# Patient Record
Sex: FEMALE | Race: WHITE | NOT HISPANIC OR LATINO | Employment: FULL TIME | URBAN - NONMETROPOLITAN AREA
[De-identification: names, ages, dates, MRNs, and addresses within clinical notes are randomized per-mention and may not be internally consistent; named-entity substitution may affect disease eponyms.]

---

## 2017-03-16 ENCOUNTER — OFFICE VISIT (OUTPATIENT)
Dept: OBSTETRICS AND GYNECOLOGY | Facility: CLINIC | Age: 22
End: 2017-03-16

## 2017-03-16 VITALS
BODY MASS INDEX: 29.88 KG/M2 | HEIGHT: 64 IN | WEIGHT: 175 LBS | SYSTOLIC BLOOD PRESSURE: 118 MMHG | DIASTOLIC BLOOD PRESSURE: 56 MMHG

## 2017-03-16 VITALS
BODY MASS INDEX: 33.12 KG/M2 | SYSTOLIC BLOOD PRESSURE: 124 MMHG | DIASTOLIC BLOOD PRESSURE: 62 MMHG | HEIGHT: 64 IN | WEIGHT: 194 LBS

## 2017-03-16 DIAGNOSIS — N94.10 DYSPAREUNIA IN FEMALE: ICD-10-CM

## 2017-03-16 DIAGNOSIS — N83.201 CYST OF RIGHT OVARY: ICD-10-CM

## 2017-03-16 DIAGNOSIS — N93.0 PCB (POST COITAL BLEEDING): ICD-10-CM

## 2017-03-16 DIAGNOSIS — R10.2 PELVIC PAIN: Primary | ICD-10-CM

## 2017-03-16 PROCEDURE — 99214 OFFICE O/P EST MOD 30 MIN: CPT | Performed by: PHYSICIAN ASSISTANT

## 2017-03-16 PROCEDURE — 76830 TRANSVAGINAL US NON-OB: CPT | Performed by: PHYSICIAN ASSISTANT

## 2017-03-16 NOTE — PATIENT INSTRUCTIONS
TVS  normal uterus--IUD in place , right ovary with 3.6 cm hemorrhagic cyst, left ovary normal  rto in 4 weeks for follow up TVS   Cyst will most likely resolve spontaneously  Call if any increased pain or heavy bleeding

## 2017-03-16 NOTE — PROGRESS NOTES
"Subjective   Chief Complaint   Patient presents with   • Vaginal Bleeding     bleeding after intercourse, occurring for 1 month   • Vaginal Pain     Visit Vitals   • /62   • Ht 64\" (162.6 cm)   • Wt 194 lb (88 kg)   • LMP  (LMP Unknown)   • BMI 33.3 kg/m2      Keyana Harrison is a 21 y.o. year old  presenting to be seen for post coital bleeding, dyspareunia and pelvic pain which started one month ago  Patient has Mirena which was placed 2016  She has random bleeds with mirena  For past month bleeding and pain q time with sex  Has not noted any unusual discharge  TVS done today notes normal uterus with IUD in place--right ovary has 3.6 cm complex/hemorrhagic appearing cyst, left ovary normal, small amt free fluid    Past Medical History   Diagnosis Date   • History of Papanicolaou smear of cervix       No current outpatient prescriptions on file.   Allergies   Allergen Reactions   • Amoxicillin       Past Surgical History   Procedure Laterality Date   • Foot surgery     • Other surgical history       pin in toe      Social History     Social History   • Marital status: Single     Spouse name: N/A   • Number of children: N/A   • Years of education: N/A     Occupational History   • Not on file.     Social History Main Topics   • Smoking status: Never Smoker   • Smokeless tobacco: Not on file   • Alcohol use 3.0 oz/week     5 Standard drinks or equivalent per week   • Drug use: No   • Sexual activity: Defer     Other Topics Concern   • Not on file     Social History Narrative      History reviewed. No pertinent family history.    The following portions of the patient's history were reviewed and updated as appropriate:problem list, current medications, allergies, past family history, past medical history, past social history and past surgical history.    Review of Systems   Constitutional: Negative.    Gastrointestinal: Negative.    Genitourinary: Positive for dyspareunia, pelvic pain and vaginal " bleeding. Negative for vaginal discharge.        Objective     Physical Exam   Constitutional: She appears well-developed and well-nourished.   Abdominal: Soft. Normal appearance. She exhibits no distension and no mass. There is no tenderness.   Genitourinary: Vagina normal and uterus normal. There is no tenderness or lesion on the right labia. There is no tenderness or lesion on the left labia. Cervix exhibits discharge. Cervix exhibits no motion tenderness and no friability. Right adnexum displays no mass and no tenderness. Left adnexum displays no mass and no tenderness.   Genitourinary Comments: IUD strings short but noted  Small amt yellow white creamy discharge   Skin: Skin is warm, dry and intact.   Psychiatric: She has a normal mood and affect. Her behavior is normal.     Keyana was seen today for vaginal bleeding and vaginal pain.    Diagnoses and all orders for this visit:    Pelvic pain  -     US Non-ob Transvaginal    PCB (post coital bleeding)  -     US Non-ob Transvaginal  -     Bacterial Vaginosis, LANETTE  -     Chlamydia trachomatis, Neisseria gonorrhoeae, PCR    Dyspareunia in female    Cyst of right ovary  -     US Non-ob Transvaginal; Future             This note was electronically signed.    Vani Alexandre PA-C   March 16, 2017

## 2017-10-09 ENCOUNTER — APPOINTMENT (OUTPATIENT)
Dept: GENERAL RADIOLOGY | Facility: HOSPITAL | Age: 22
End: 2017-10-09

## 2017-10-09 ENCOUNTER — HOSPITAL ENCOUNTER (EMERGENCY)
Facility: HOSPITAL | Age: 22
Discharge: HOME | End: 2017-10-09
Attending: EMERGENCY MEDICINE | Admitting: EMERGENCY MEDICINE

## 2017-10-09 VITALS
RESPIRATION RATE: 17 BRPM | SYSTOLIC BLOOD PRESSURE: 122 MMHG | HEART RATE: 88 BPM | WEIGHT: 180 LBS | HEIGHT: 65 IN | OXYGEN SATURATION: 98 % | TEMPERATURE: 98.1 F | BODY MASS INDEX: 29.99 KG/M2 | DIASTOLIC BLOOD PRESSURE: 78 MMHG

## 2017-10-09 DIAGNOSIS — S93.402A SPRAIN OF LEFT ANKLE, UNSPECIFIED LIGAMENT, INITIAL ENCOUNTER: Primary | ICD-10-CM

## 2017-10-09 PROCEDURE — 99283 EMERGENCY DEPT VISIT LOW MDM: CPT

## 2017-10-09 PROCEDURE — 73610 X-RAY EXAM OF ANKLE: CPT

## 2017-10-09 RX ORDER — NAPROXEN 500 MG/1
500 TABLET ORAL 2 TIMES DAILY PRN
Qty: 14 TABLET | Refills: 0 | Status: SHIPPED | OUTPATIENT
Start: 2017-10-09 | End: 2020-10-06

## 2017-10-09 NOTE — ED PROVIDER NOTES
Subjective   HPI Comments: 22-year-old female presents with left ankle pain, she states that she twisted her ankle walking yesterday and is able to bear weight on the ankle.  She's had previous recent ankle the last several months.  She states the pain is on the inside and outside ankle she has swelling and unable to put full weight on the ankle.    Patient is a 22 y.o. female presenting with lower extremity pain.   History provided by:  Patient   used: No    Lower Extremity Issue   Location:  Ankle  Time since incident:  1 day  Injury: yes    Ankle location:  L ankle  Pain details:     Quality:  Aching, pressure and sharp    Radiates to:  Does not radiate    Severity:  Mild    Onset quality:  Sudden    Duration:  1 day    Timing:  Constant    Progression:  Waxing and waning  Chronicity:  Recurrent  Dislocation: no    Foreign body present:  No foreign bodies  Prior injury to area:  Yes  Relieved by:  Elevation and rest  Worsened by:  Bearing weight, extension, abduction and flexion  Ineffective treatments:  Elevation  Associated symptoms: decreased ROM and swelling        Review of Systems   Musculoskeletal:        Left ankle injury   All other systems reviewed and are negative.      Past Medical History:   Diagnosis Date   • History of Papanicolaou smear of cervix 2015       Allergies   Allergen Reactions   • Amoxicillin        Past Surgical History:   Procedure Laterality Date   • FOOT SURGERY     • OTHER SURGICAL HISTORY      pin in toe       History reviewed. No pertinent family history.    Social History     Social History   • Marital status: Single     Spouse name: N/A   • Number of children: N/A   • Years of education: N/A     Social History Main Topics   • Smoking status: Never Smoker   • Smokeless tobacco: None   • Alcohol use 3.0 oz/week     5 Standard drinks or equivalent per week   • Drug use: No   • Sexual activity: Defer     Other Topics Concern   • None     Social History Narrative            Objective   Physical Exam   Constitutional: She is oriented to person, place, and time. She appears well-developed and well-nourished.   HENT:   Head: Normocephalic.   Eyes: EOM are normal.   Neck: Normal range of motion. Neck supple.   Cardiovascular: Normal rate and regular rhythm.    Pulmonary/Chest: Effort normal and breath sounds normal.   Abdominal: Soft. Bowel sounds are normal.   Musculoskeletal: Normal range of motion. She exhibits tenderness.   Tender to palpation left ankle, neurovascularly intact   Neurological: She is alert and oriented to person, place, and time. She has normal reflexes.   Skin: Skin is warm and dry.   Psychiatric: She has a normal mood and affect.   Nursing note and vitals reviewed.      Procedures         ED Course  ED Course                  MDM    Final diagnoses:   Sprain of left ankle, unspecified ligament, initial encounter            Daniel Galloway Jr., PA-C  10/09/17 0403

## 2018-01-19 ENCOUNTER — TELEPHONE (OUTPATIENT)
Dept: OBSTETRICS AND GYNECOLOGY | Facility: CLINIC | Age: 23
End: 2018-01-19

## 2018-01-19 NOTE — TELEPHONE ENCOUNTER
PT NO SHOWED FOR TVS AND NOW SHE IS IN FOLLOW UP WORKQUEUE.  COULD SOMEONE CALL HER TO SEE IF SHE STILL NEEDS TVS?  IF NOT, WE NEED TO LET PROVIDER KNOW. IF SHE DOES, SHE WILL NEED TO BE SCHEDULED FOR AN APPT.              THANKS,       HEMA

## 2018-01-19 NOTE — TELEPHONE ENCOUNTER
CALLED AND SPOKE WITH PATIENT, SHE ADVISED ALREADY HAD FOLLOW UP AND WASN'T SUPPOSE TO COME BACK, NOT HAVING ANY FURTHER PROBLEMS.

## 2019-06-19 ENCOUNTER — OFFICE VISIT (OUTPATIENT)
Dept: OBSTETRICS AND GYNECOLOGY | Facility: CLINIC | Age: 24
End: 2019-06-19

## 2019-06-19 VITALS
HEIGHT: 65 IN | DIASTOLIC BLOOD PRESSURE: 68 MMHG | WEIGHT: 169.4 LBS | SYSTOLIC BLOOD PRESSURE: 108 MMHG | BODY MASS INDEX: 28.22 KG/M2

## 2019-06-19 DIAGNOSIS — Z12.4 SCREENING FOR MALIGNANT NEOPLASM OF CERVIX: ICD-10-CM

## 2019-06-19 DIAGNOSIS — Z01.419 ENCOUNTER FOR GYNECOLOGICAL EXAMINATION WITHOUT ABNORMAL FINDING: Primary | ICD-10-CM

## 2019-06-19 PROCEDURE — 99395 PREV VISIT EST AGE 18-39: CPT | Performed by: PHYSICIAN ASSISTANT

## 2019-06-19 NOTE — PROGRESS NOTES
"Subjective   Chief Complaint   Patient presents with   • Gynecologic Exam     Last pap done in , No complaints       Keyana Harrison is a 23 y.o. year old  presenting to be seen for her annual gynecological exam.   She has no complaints  Has Mirena IUD placed 2016  No bleeding with IUD typically but has had some random spotting the past 2 months  States had STI screening a few months ago and doesn't feel need to repeat now     Past Medical History:   Diagnosis Date   • History of Papanicolaou smear of cervix         Current Outpatient Medications:   •  naproxen (NAPROSYN) 500 MG tablet, Take 1 tablet by mouth 2 (Two) Times a Day As Needed for Mild Pain  for up to 14 doses., Disp: 14 tablet, Rfl: 0   Allergies   Allergen Reactions   • Amoxicillin Anaphylaxis      Past Surgical History:   Procedure Laterality Date   • FOOT SURGERY     • OTHER SURGICAL HISTORY      pin in toe      Social History     Socioeconomic History   • Marital status: Single     Spouse name: Not on file   • Number of children: Not on file   • Years of education: Not on file   • Highest education level: Not on file   Tobacco Use   • Smoking status: Never Smoker   • Smokeless tobacco: Never Used   Substance and Sexual Activity   • Alcohol use: Yes     Alcohol/week: 3.0 oz     Types: 5 Standard drinks or equivalent per week   • Drug use: No   • Sexual activity: Not Currently     Birth control/protection: IUD      Family History   Problem Relation Age of Onset   • No Known Problems Father    • Polycystic ovary syndrome Mother    • Polycystic ovary syndrome Sister        Review of Systems   Constitutional: Negative.    Gastrointestinal: Negative.    Genitourinary: Positive for vaginal bleeding. Negative for difficulty urinating, dysuria, pelvic pain and vaginal discharge.           Objective   /68   Ht 165.1 cm (65\")   Wt 76.8 kg (169 lb 6.4 oz)   Breastfeeding? No   BMI 28.19 kg/m²     Physical Exam   Constitutional: " She appears well-developed and well-nourished. She is cooperative.   Pulmonary/Chest: Right breast exhibits no inverted nipple, no mass, no nipple discharge, no skin change and no tenderness. Left breast exhibits no inverted nipple, no mass, no nipple discharge, no skin change and no tenderness.   Abdominal: Soft. Normal appearance. There is no tenderness.   Genitourinary: Vagina normal and uterus normal. There is no tenderness or lesion on the right labia. There is no tenderness or lesion on the left labia. Cervix exhibits no motion tenderness and no discharge. Right adnexum displays no mass and no tenderness. Left adnexum displays no mass and no tenderness.   Genitourinary Comments: IUD strings 3 cm  Pap done   Neurological: She is alert.   Skin: Skin is warm and dry.   Psychiatric: She has a normal mood and affect. Her behavior is normal.            Assessment and Plan  Keyana was seen today for gynecologic exam.    Diagnoses and all orders for this visit:    Encounter for gynecological examination without abnormal finding    Screening for malignant neoplasm of cervix  -     Liquid-based Pap Smear, Screening; Future      Patient Instructions   Encourage self breast exam monthly  Regular excercise               This note was electronically signed.    Vani Alexandre PA-C   June 19, 2019

## 2019-06-27 DIAGNOSIS — Z12.4 SCREENING FOR MALIGNANT NEOPLASM OF CERVIX: ICD-10-CM

## 2020-08-27 ENCOUNTER — TRANSCRIBE ORDERS (OUTPATIENT)
Dept: GENERAL RADIOLOGY | Facility: HOSPITAL | Age: 25
End: 2020-08-27

## 2020-08-27 ENCOUNTER — HOSPITAL ENCOUNTER (OUTPATIENT)
Dept: GENERAL RADIOLOGY | Facility: HOSPITAL | Age: 25
Discharge: HOME | End: 2020-08-27
Admitting: NURSE PRACTITIONER

## 2020-08-27 DIAGNOSIS — M25.572 ARTHRALGIA OF LEFT ANKLE OR FOOT: ICD-10-CM

## 2020-08-27 DIAGNOSIS — M25.572 ARTHRALGIA OF LEFT ANKLE OR FOOT: Primary | ICD-10-CM

## 2020-08-27 PROCEDURE — 73610 X-RAY EXAM OF ANKLE: CPT

## 2020-09-02 ENCOUNTER — TRANSCRIBE ORDERS (OUTPATIENT)
Dept: ADMINISTRATIVE | Facility: HOSPITAL | Age: 25
End: 2020-09-02

## 2020-09-02 DIAGNOSIS — M25.572 ARTHRALGIA OF LEFT ANKLE OR FOOT: Primary | ICD-10-CM

## 2020-10-06 ENCOUNTER — TELEPHONE (OUTPATIENT)
Dept: OBSTETRICS AND GYNECOLOGY | Facility: CLINIC | Age: 25
End: 2020-10-06

## 2020-10-06 ENCOUNTER — OFFICE VISIT (OUTPATIENT)
Dept: OBSTETRICS AND GYNECOLOGY | Facility: CLINIC | Age: 25
End: 2020-10-06

## 2020-10-06 VITALS
DIASTOLIC BLOOD PRESSURE: 62 MMHG | SYSTOLIC BLOOD PRESSURE: 110 MMHG | HEIGHT: 65 IN | BODY MASS INDEX: 27.39 KG/M2 | WEIGHT: 164.4 LBS

## 2020-10-06 DIAGNOSIS — Z01.411 ENCOUNTER FOR GYNECOLOGICAL EXAMINATION WITH ABNORMAL FINDING: Primary | ICD-10-CM

## 2020-10-06 DIAGNOSIS — L68.0 FEMALE HIRSUTISM: ICD-10-CM

## 2020-10-06 DIAGNOSIS — Z12.4 SCREENING FOR CERVICAL CANCER: ICD-10-CM

## 2020-10-06 DIAGNOSIS — Z30.431 ENCOUNTER FOR MANAGEMENT OF INTRAUTERINE CONTRACEPTIVE DEVICE (IUD), UNSPECIFIED IUD MANAGEMENT TYPE: ICD-10-CM

## 2020-10-06 DIAGNOSIS — N93.0 POSTCOITAL BLEEDING: ICD-10-CM

## 2020-10-06 PROCEDURE — 99213 OFFICE O/P EST LOW 20 MIN: CPT | Performed by: PHYSICIAN ASSISTANT

## 2020-10-06 PROCEDURE — 99395 PREV VISIT EST AGE 18-39: CPT | Performed by: PHYSICIAN ASSISTANT

## 2020-10-06 NOTE — PATIENT INSTRUCTIONS
Encourage self breast exam monthly  Regular exercise  VG plus swab done and will contact patient with results when available  RTO 3 weeks for Mirena removal and reinsertion pending normal pap and negative vaginal swab

## 2020-10-06 NOTE — TELEPHONE ENCOUNTER
----- Message from Billie Doran sent at 10/6/2020 10:29 AM EDT -----  Regarding: MIRENA  Contact: PT  PT SAW DEONNA TODAY AND NEEDS A NEW MIRENA.  PLEASE E-PRESCRIBE TO [facility].  THANKS

## 2020-10-06 NOTE — PROGRESS NOTES
Subjective   Chief Complaint   Patient presents with   • Gynecologic Exam     Last pap done 19-WNL, C/O bleeding after intercourse   • Contraception     Patient would like to discuss IUD removal and reinsertion       Keyana Harrison is a 25 y.o. year old  presenting to be seen for her annual gynecological exam.   She has a Mirena IUD which is due for removal.  She would like reinsertion of a new IUD.  She is having no menstrual periods with her IUD.  She has had some postcoital bleeding that she reports has been going on for a few months.  She states the bleeding will last for 1 to 2 days after intercourse.  It is occurring most times that she has intercourse.  She denies any vaginal discharge itching or burning.  She has no pain with intercourse.  She has a complaint also of facial hair.  This has become very notable in the last year.  This is on her chin mainly and lower jaws.  It is gotten to the point that she shaves the area due to dark coarse facial hair.    Past Medical History:   Diagnosis Date   • History of Papanicolaou smear of cervix       No current outpatient medications on file.   Allergies   Allergen Reactions   • Amoxicillin Anaphylaxis      Past Surgical History:   Procedure Laterality Date   • FOOT SURGERY     • OTHER SURGICAL HISTORY      pin in toe      Social History     Socioeconomic History   • Marital status: Single     Spouse name: Not on file   • Number of children: Not on file   • Years of education: Not on file   • Highest education level: Not on file   Tobacco Use   • Smoking status: Never Smoker   • Smokeless tobacco: Never Used   Substance and Sexual Activity   • Alcohol use: Yes     Alcohol/week: 5.0 standard drinks     Types: 5 Standard drinks or equivalent per week   • Drug use: No   • Sexual activity: Yes     Birth control/protection: I.U.D.      Family History   Problem Relation Age of Onset   • No Known Problems Father    • Polycystic ovary syndrome Mother    •  "Polycystic ovary syndrome Sister        Review of Systems   Constitutional: Negative for chills, diaphoresis and fever.   Gastrointestinal: Negative for abdominal pain, constipation, diarrhea, nausea and vomiting.   Genitourinary: Positive for vaginal bleeding. Negative for difficulty urinating, dysuria, menstrual problem, pelvic pain, vaginal discharge and vaginal pain.   Musculoskeletal: Negative.    All other systems reviewed and are negative.          Objective   /62   Ht 165.1 cm (65\")   Wt 74.6 kg (164 lb 6.4 oz)   Breastfeeding No   BMI 27.36 kg/m²     Physical Exam  Constitutional:       Appearance: Normal appearance. She is well-developed, well-groomed and normal weight.   Eyes:      General: Lids are normal.      Extraocular Movements: Extraocular movements intact.      Conjunctiva/sclera: Conjunctivae normal.   Chest:      Breasts: Breasts are symmetrical.         Right: No inverted nipple, mass, nipple discharge, skin change or tenderness.         Left: No inverted nipple, mass, nipple discharge, skin change or tenderness.   Abdominal:      General: Abdomen is flat.      Palpations: Abdomen is soft. There is no mass.      Tenderness: There is no abdominal tenderness. There is no guarding.   Genitourinary:     Labia:         Right: No rash, tenderness or lesion.         Left: No rash, tenderness or lesion.       Urethra: No prolapse, urethral pain or urethral lesion.      Vagina: Vaginal discharge present. No erythema, tenderness, bleeding or lesions.      Cervix: Discharge present. No cervical motion tenderness, friability, lesion, erythema or cervical bleeding.      Uterus: Normal.       Adnexa:         Right: No mass or tenderness.          Left: No mass or tenderness.        Comments: Pap done  IUD strings 2 cm  Moderate amount tan creamy discharge  Musculoskeletal: Normal range of motion.   Skin:     General: Skin is warm and dry.      Findings: No lesion or rash.      Comments: Significant " dark coarse hair stubble chin   Neurological:      General: No focal deficit present.      Mental Status: She is alert.   Psychiatric:         Attention and Perception: Attention normal.         Mood and Affect: Mood normal.         Speech: Speech normal.         Behavior: Behavior is cooperative.         Thought Content: Thought content normal. Thought content is not paranoid.              Assessment and Plan  Keyana was seen today for gynecologic exam and contraception.    Diagnoses and all orders for this visit:    Encounter for gynecological examination with abnormal finding    Screening for cervical cancer  -     Liquid-based Pap Smear, Screening; Future    Female hirsutism  -     Testosterone (Free & Total), LC / MS  -     17-Hydroxyprogesterone  -     DHEA-Sulfate    Postcoital bleeding  -     NuSwab VG+ - Swab, Cervix    Encounter for management of intrauterine contraceptive device (IUD), unspecified IUD management type      Patient Instructions   Encourage self breast exam monthly  Regular exercise  VG plus swab done and will contact patient with results when available  RTO 3 weeks for Mirena removal and reinsertion pending normal pap and negative vaginal swab             This note was electronically signed.    Vani Alexandre PA-C   October 6, 2020

## 2020-10-09 LAB
A VAGINAE DNA VAG QL NAA+PROBE: ABNORMAL SCORE
BVAB2 DNA VAG QL NAA+PROBE: ABNORMAL SCORE
C ALBICANS DNA VAG QL NAA+PROBE: NEGATIVE
C GLABRATA DNA VAG QL NAA+PROBE: NEGATIVE
C TRACH DNA VAG QL NAA+PROBE: NEGATIVE
MEGA1 DNA VAG QL NAA+PROBE: ABNORMAL SCORE
N GONORRHOEA DNA VAG QL NAA+PROBE: NEGATIVE
T VAGINALIS DNA VAG QL NAA+PROBE: NEGATIVE

## 2020-10-09 RX ORDER — METRONIDAZOLE 500 MG/1
500 TABLET ORAL 2 TIMES DAILY
Qty: 14 TABLET | Refills: 0 | Status: SHIPPED | OUTPATIENT
Start: 2020-10-09 | End: 2020-10-16

## 2020-10-10 LAB
17OHP SERPL-MCNC: 31 NG/DL
DHEA-S SERPL-MCNC: 422 UG/DL (ref 84.8–378)
TESTOST FREE SERPL-MCNC: 4.8 PG/ML (ref 0–4.2)
TESTOST SERPL-MCNC: 28.3 NG/DL (ref 10–55)

## 2020-10-12 ENCOUNTER — OFFICE VISIT (OUTPATIENT)
Dept: ORTHOPEDIC SURGERY | Facility: CLINIC | Age: 25
End: 2020-10-12

## 2020-10-12 VITALS — HEIGHT: 65 IN | WEIGHT: 164 LBS | BODY MASS INDEX: 27.32 KG/M2 | RESPIRATION RATE: 18 BRPM

## 2020-10-12 DIAGNOSIS — S93.492A SPRAIN OF ANTERIOR TALOFIBULAR LIGAMENT OF LEFT ANKLE, INITIAL ENCOUNTER: ICD-10-CM

## 2020-10-12 DIAGNOSIS — M25.872 ANKLE IMPINGEMENT SYNDROME, LEFT: Primary | ICD-10-CM

## 2020-10-12 DIAGNOSIS — Q68.8 OS TRIGONUM SYNDROME: ICD-10-CM

## 2020-10-12 PROCEDURE — 99213 OFFICE O/P EST LOW 20 MIN: CPT | Performed by: PHYSICIAN ASSISTANT

## 2020-10-12 NOTE — PROGRESS NOTES
Subjective   Patient ID: Keyana Harrison is a 25 y.o. right hand dominant female  Injury of the Left Ankle (Reports she fell while rock climbing the first week of April 2020, treated as sprain, got some better but now getting worse)         History of Present Illness    Patient presents as a new patient with complaints of left ankle pain that began the first week of April 2020.  She states while rockclimbing she fell twisting her ankle.  She did try ice, rest, elevating the ankle, oral ibuprofen as well as a pneumatic boot without significant improvement.  In fact, she states the boot caused pain to other portions of her body.  Patient denies numbness or tingling.  She states plantar flexing the ankle intensifies her pain.    Pain Score: 9  Pain Location: Ankle        Pain Descriptors: Throbbing, Sharp  Pain Frequency: Intermittent  Pain Onset: Ongoing                 Pain Intervention(s): Rest, Home medication, Cold applied, Elevated  Result of Injury: Yes  Work-Related Injury: No    Past Medical History:   Diagnosis Date   • History of Papanicolaou smear of cervix 2015        Past Surgical History:   Procedure Laterality Date   • FOOT SURGERY Right 2011    5th toe reattachment by Dr Diallo   • OTHER SURGICAL HISTORY      pin in toe       Family History   Problem Relation Age of Onset   • No Known Problems Father    • Polycystic ovary syndrome Mother    • Polycystic ovary syndrome Sister        Social History     Socioeconomic History   • Marital status: Single     Spouse name: Not on file   • Number of children: Not on file   • Years of education: Not on file   • Highest education level: Not on file   Occupational History   • Occupation: manager     Employer: [employer]     Comment: since 2016   Tobacco Use   • Smoking status: Never Smoker   • Smokeless tobacco: Never Used   Substance and Sexual Activity   • Alcohol use: Yes     Alcohol/week: 5.0 standard drinks     Types: 5 Standard drinks or  "equivalent per week   • Drug use: No   • Sexual activity: Yes     Birth control/protection: I.U.D.   Social History Narrative    Right hand dominant         Current Outpatient Medications:   •  levonorgestrel (Mirena, 52 MG,) 20 MCG/24HR IUD, 1 each by Intrauterine route 1 (One) Time for 1 dose., Disp: 1 each, Rfl: 0  •  metroNIDAZOLE (Flagyl) 500 MG tablet, Take 1 tablet by mouth 2 (Two) Times a Day for 7 days., Disp: 14 tablet, Rfl: 0    Allergies   Allergen Reactions   • Amoxicillin Anaphylaxis       Review of Systems   Constitutional: Negative for diaphoresis, fever and unexpected weight change.   HENT: Negative for dental problem and sore throat.    Eyes: Negative for visual disturbance.   Respiratory: Negative for shortness of breath.    Cardiovascular: Negative for chest pain.   Gastrointestinal: Negative for abdominal pain, constipation, diarrhea, nausea and vomiting.   Genitourinary: Negative for difficulty urinating and frequency.   Musculoskeletal: Positive for arthralgias (left ankle).   Neurological: Negative for headaches.   Hematological: Does not bruise/bleed easily.        I have reviewed the medical and surgical history, family history, social history, medications, and/or allergies, and the review of systems of this report.    Objective   Resp 18   Ht 165.1 cm (65\")   Wt 74.4 kg (164 lb)   BMI 27.29 kg/m²    Physical Exam  Vitals signs and nursing note reviewed.   Cardiovascular:      Pulses: Normal pulses.   Pulmonary:      Effort: Pulmonary effort is normal. No respiratory distress.   Musculoskeletal:      Left ankle: She exhibits no ecchymosis, no deformity and normal pulse. Tenderness. AITFL, CF ligament and posterior TFL tenderness found. No head of 5th metatarsal and no proximal fibula tenderness found. Achilles tendon exhibits no pain, no defect and normal Wharton's test results.      Left foot: Normal capillary refill. No bony tenderness, swelling, crepitus or deformity.   Neurological: "      General: No focal deficit present.      Mental Status: She is alert and oriented to person, place, and time.   Psychiatric:         Mood and Affect: Mood normal.       Left Ankle Exam     Range of Motion   Plantar flexion: 30     Muscle Strength   Dorsiflexion:  4/5   Plantar flexion:  4/5     Tests   Anterior drawer: trace  Varus tilt: 1+    Other   Erythema: absent  Sensation: normal  Pulse: present           Extremity DVT signs are negative on physical exam with negative Brandan sign, no calf pain, no palpable cords and no skin tone change   Neurologic Exam     Mental Status   Oriented to person, place, and time.              Assessment/Plan   Independent Review of Radiographic Studies:    No new imaging done today.  I did review MRI imaging of the left ankle which does reveal anterior talofibular ligament sprain as well as calcaneofibular ligament partially torn.  There is evidence of os trigonum with marrow edema typically seen with posterior tibiotalar impingement    Procedures       Keyana was seen today for injury.    Diagnoses and all orders for this visit:    Ankle impingement syndrome, left  -     Ambulatory Referral to Physical Therapy Evaluate and treat, Ortho; Heat; Stretching, Strengthening, ROM    Os trigonum syndrome  -     Ambulatory Referral to Physical Therapy Evaluate and treat, Ortho; Heat; Stretching, Strengthening, ROM    Sprain of anterior talofibular ligament of left ankle, initial encounter  -     Ambulatory Referral to Physical Therapy Evaluate and treat, Ortho; Heat; Stretching, Strengthening, ROM       Discussion of orthopedic goals  Risk, benefits, and merits of treatment alternatives reviewed with the patient and questions answered  Physical therapy referral given  Elevate leg and foot for residual swelling  Weight bearing parameters reviewed  Ice, heat, and/or modalities as beneficial    Recommendations/Plan:      Follow-up in 8 weeks after formal physical therapy.  Patient was  provided an ankle lace up brace to be worn when weightbearing  I did recommend taking ibuprofen as directed for the next 7 to 10 days  Patient agreeable to call or return sooner for any concerns.    I explained to the patient if she does not improve with formal physical therapy and using the ankle lace up brace patient may need to be evaluated by foot and ankle subspecialist           EMR Dragon-transcription disclaimer:  This encounter note is an electronic transcription of spoken language to printed text.  Electronic transcription of spoken language may permit erroneous or at times nonsensical words or phrases to be inadvertently transcribed.  Although I have reviewed the note for such errors, some may still exist

## 2020-10-14 DIAGNOSIS — Z12.4 SCREENING FOR CERVICAL CANCER: ICD-10-CM

## 2020-10-20 RX ORDER — DOXYCYCLINE 100 MG/1
100 TABLET ORAL 2 TIMES DAILY
Qty: 20 TABLET | Refills: 0 | Status: SHIPPED | OUTPATIENT
Start: 2020-10-20 | End: 2020-10-30

## 2020-10-20 RX ORDER — DESOGESTREL AND ETHINYL ESTRADIOL 0.15-0.03
1 KIT ORAL DAILY
Qty: 28 TABLET | Refills: 3 | Status: SHIPPED | OUTPATIENT
Start: 2020-10-20 | End: 2021-02-05

## 2020-10-28 ENCOUNTER — TELEPHONE (OUTPATIENT)
Dept: OBSTETRICS AND GYNECOLOGY | Facility: CLINIC | Age: 25
End: 2020-10-28

## 2020-10-28 ENCOUNTER — OFFICE VISIT (OUTPATIENT)
Dept: OBSTETRICS AND GYNECOLOGY | Facility: CLINIC | Age: 25
End: 2020-10-28

## 2020-10-28 VITALS
DIASTOLIC BLOOD PRESSURE: 74 MMHG | BODY MASS INDEX: 27.99 KG/M2 | SYSTOLIC BLOOD PRESSURE: 118 MMHG | HEIGHT: 65 IN | WEIGHT: 168 LBS

## 2020-10-28 DIAGNOSIS — Z30.432 ENCOUNTER FOR IUD REMOVAL: Primary | ICD-10-CM

## 2020-10-28 PROCEDURE — 58301 REMOVE INTRAUTERINE DEVICE: CPT | Performed by: OBSTETRICS & GYNECOLOGY

## 2020-10-28 NOTE — TELEPHONE ENCOUNTER
----- Message from Becca Turner sent at 10/28/2020  8:53 AM EDT -----  Regarding: Refill  Pt needs a refill for her birth control. (Helene's pt)    RX: [facility] [place]

## 2020-10-28 NOTE — PROGRESS NOTES
IUD Removal    Date of procedure:  10/28/2020    Risks and benefits discussed? yes  All questions answered? yes  Consents given by The patient  Written consent obtained? yes  Reason for removal: Device expiration    Local anesthesia used:  no    Procedure documentation:    A speculum was placed in order to view the cervix.  A tenaculum did not need to be placed on the anterior cervical lip.  Cervical dilation did not need to be performed in order to access the string.  The IUD string was easily seen.  The string was grasped and the IUD was removed without difficulty.  The IUD did not appear to be adherent to the uterine cavity. It was removed intact.    She tolerated the procedure, but did have pain. She was given Motrin 600mg one time.     Post procedure instructions: Patient notified to call with heavy bleeding, fever or increasing pain.    Follow up with Helene next week.    Dev Martínez MD  Obstetrics and Gynecology  [facility]

## 2020-11-03 ENCOUNTER — OFFICE VISIT (OUTPATIENT)
Dept: OBSTETRICS AND GYNECOLOGY | Facility: CLINIC | Age: 25
End: 2020-11-03

## 2020-11-03 VITALS
HEIGHT: 65 IN | WEIGHT: 167.4 LBS | SYSTOLIC BLOOD PRESSURE: 112 MMHG | DIASTOLIC BLOOD PRESSURE: 60 MMHG | BODY MASS INDEX: 27.89 KG/M2

## 2020-11-03 DIAGNOSIS — E28.1 ELEVATED ANDROGEN LEVELS: Primary | ICD-10-CM

## 2020-11-03 DIAGNOSIS — L68.0 FEMALE HIRSUTISM: ICD-10-CM

## 2020-11-03 PROCEDURE — 99213 OFFICE O/P EST LOW 20 MIN: CPT | Performed by: PHYSICIAN ASSISTANT

## 2020-11-03 NOTE — PROGRESS NOTES
Subjective   Chief Complaint   Patient presents with   • Follow-up     1 week follow up. Patient had IUD removed and is doing good.       Keyana Harrison is a 25 y.o. year old  presenting to be seen for follow up.  She had IUD removed last week. Recent pap noted actinomyces and patient reports she desires new pessary in a few months.  She is now on first pack of ocp's and doing well.  Recent labs noted sightly elevated free testosterone and DHEAS. Patient had had complaint of hirsutism.  We discussed possibly starting spironolactone but since we are switching to ocp will wait and see if ocp will bring down levels.  She has no new complaints or concerns          Past Medical History:   Diagnosis Date   • History of Papanicolaou smear of cervix         Current Outpatient Medications:   •  desogestrel-ethinyl estradiol (APRI) 0.15-30 MG-MCG per tablet, Take 1 tablet by mouth Daily., Disp: 28 tablet, Rfl: 3   Allergies   Allergen Reactions   • Amoxicillin Anaphylaxis      Past Surgical History:   Procedure Laterality Date   • FOOT SURGERY Right     5th toe reattachment by Dr Diallo   • OTHER SURGICAL HISTORY      pin in toe      Social History     Socioeconomic History   • Marital status: Single     Spouse name: Not on file   • Number of children: Not on file   • Years of education: Not on file   • Highest education level: Not on file   Occupational History   • Occupation: manager     Employer: [employer]     Comment: since 2016   Social Needs   • Financial resource strain: Not hard at all   • Food insecurity     Worry: Never true     Inability: Never true   • Transportation needs     Medical: No     Non-medical: No   Tobacco Use   • Smoking status: Never Smoker   • Smokeless tobacco: Never Used   Substance and Sexual Activity   • Alcohol use: Yes     Alcohol/week: 5.0 standard drinks     Types: 5 Standard drinks or equivalent per week   • Drug use: No   • Sexual activity: Yes     Birth  "control/protection: I.U.D.   Lifestyle   • Physical activity     Days per week: 5 days     Minutes per session: 90 min   • Stress: Only a little   Social History Narrative    Right hand dominant      Family History   Problem Relation Age of Onset   • No Known Problems Father    • Polycystic ovary syndrome Mother    • Polycystic ovary syndrome Sister        Review of Systems   Constitutional: Negative for chills, fatigue and fever.   Gastrointestinal: Negative for abdominal pain, constipation, diarrhea, nausea and vomiting.   Genitourinary: Negative for difficulty urinating, dysuria, pelvic pain, vaginal bleeding and vaginal discharge.   Musculoskeletal: Negative.            Objective   /60   Ht 165.1 cm (65\")   Wt 75.9 kg (167 lb 6.4 oz)   Breastfeeding No   BMI 27.86 kg/m²     Physical Exam         Assessment and Plan  Diagnoses and all orders for this visit:    1. Elevated androgen levels (Primary)  -     Testosterone (Free & Total), LC / MS; Future  -     DHEA-Sulfate; Future    2. Female hirsutism  -     Testosterone (Free & Total), LC / MS; Future  -     DHEA-Sulfate; Future      Patient Instructions   Will continue ocp  Plan repeat free testosterone and  DHEAS in 3 months. If still elevated will discuss starting spironolactone             This note was electronically signed.    Vani Alexandre PA-C   November 4, 2020  "

## 2020-11-04 NOTE — PATIENT INSTRUCTIONS
Will continue ocp  Plan repeat free testosterone and  DHEAS in 3 months. If still elevated will discuss starting spironolactone

## 2021-02-05 RX ORDER — DESOGESTREL AND ETHINYL ESTRADIOL 0.15-0.03
KIT ORAL
Qty: 84 TABLET | Refills: 1 | Status: SHIPPED | OUTPATIENT
Start: 2021-02-05 | End: 2021-10-14

## 2021-10-14 RX ORDER — DESOGESTREL AND ETHINYL ESTRADIOL 0.15-0.03
KIT ORAL
Qty: 84 TABLET | Refills: 0 | Status: SHIPPED | OUTPATIENT
Start: 2021-10-14 | End: 2022-04-19

## 2022-02-15 ENCOUNTER — OFFICE VISIT (OUTPATIENT)
Dept: OBSTETRICS AND GYNECOLOGY | Facility: CLINIC | Age: 27
End: 2022-02-15

## 2022-02-15 VITALS
HEIGHT: 66 IN | DIASTOLIC BLOOD PRESSURE: 62 MMHG | WEIGHT: 177 LBS | SYSTOLIC BLOOD PRESSURE: 118 MMHG | BODY MASS INDEX: 28.45 KG/M2

## 2022-02-15 DIAGNOSIS — N92.6 IRREGULAR MENSES: ICD-10-CM

## 2022-02-15 DIAGNOSIS — L68.0 FEMALE HIRSUTISM: Primary | ICD-10-CM

## 2022-02-15 DIAGNOSIS — E28.1 ELEVATED ANDROGEN LEVELS: ICD-10-CM

## 2022-02-15 DIAGNOSIS — Z30.8 ENCOUNTER FOR OTHER CONTRACEPTIVE MANAGEMENT: ICD-10-CM

## 2022-02-15 PROCEDURE — 99214 OFFICE O/P EST MOD 30 MIN: CPT | Performed by: OBSTETRICS & GYNECOLOGY

## 2022-02-15 NOTE — PROGRESS NOTES
Chief Complaint  Consult (Patient wants to discuss Mirena IUD. Patient also requested labs to check hormone levels. )     History of Present Illness:  Patient is 26 y.o.  who presents to [facility] for discussion of possible Mirena IUD as well as hormone levels.  Patient had previous Mirena IUD.  Patient is having pelvic pain.  Patient did have actinomyces.  Her IUD was removed.  Patient reports her menstrual cycle has been irregular.  Patient reports they will vary from every 2 to 3 weeks.  Patient was on oral contraceptives.  Patient has been off of them now for 2 months.  Patient does have complaints of facial hair as well as an increase in acne.  Patient would like to have her hormone levels checked.  Patient would like to consider having replacement of her IUD.    History  Past Medical History:   Diagnosis Date   • Depression More than a year   • History of Papanicolaou smear of cervix    • Ovarian cyst 2019     Current Outpatient Medications on File Prior to Visit   Medication Sig Dispense Refill   • Apri 0.15-30 MG-MCG per tablet TAKE 1 TABLET BY MOUTH EVERY DAY 84 tablet 0     No current facility-administered medications on file prior to visit.     Allergies   Allergen Reactions   • Amoxicillin Anaphylaxis     Past Surgical History:   Procedure Laterality Date   • FOOT SURGERY Right     5th toe reattachment by Dr Diallo   • OTHER SURGICAL HISTORY      pin in toe     Family History   Problem Relation Age of Onset   • No Known Problems Father    • Polycystic ovary syndrome Mother    • Polycystic ovary syndrome Sister      Social History     Socioeconomic History   • Marital status: Single   Tobacco Use   • Smoking status: Never Smoker   • Smokeless tobacco: Never Used   Vaping Use   • Vaping Use: Never used   Substance and Sexual Activity   • Alcohol use: Yes     Alcohol/week: 2.0 standard drinks     Types: 2 Glasses of wine per week   • Drug use: No   • Sexual  "activity: Not Currently     Partners: Male     Birth control/protection: Condom     Comment: Birth control pills       Physical Examination:  Vital Signs: /62   Ht 167.6 cm (66\")   Wt 80.3 kg (177 lb)   BMI 28.57 kg/m²     General Appearance: alert, appears stated age, and cooperative  Breasts: Not performed.  Abdomen: no masses, no hepatomegaly, no splenomegaly, soft non-tender, no guarding and no rebound tenderness  Pelvic: Not performed.    Data Review:  The following data was reviewed by: Ana Rosa Wesley MD on 02/15/2022:     Labs:  Liquid-based Pap Smear, Screening (10/06/2020)  Testosterone (Free & Total), LC / MS (10/06/2020 10:23)  17-Hydroxyprogesterone (10/06/2020 10:23)  DHEA-Sulfate (10/06/2020 10:23)  NuSwab VG+ - Swab, Cervix (10/06/2020 10:30)    Imaging:    Medical Records:  None    Assessment and Plan   Problem List Items Addressed This Visit     None      Visit Diagnoses     Female hirsutism    -  Primary  We will obtain hormone levels today as noted.  Plan pending results.    Relevant Orders    Testosterone, Free, Total    Follicle Stimulating Hormone    Estradiol    DHEA-Sulfate    Elevated androgen levels      Patient with elevated DHEA-S and testosterone in the past.  Patient is on oral contraceptives.  Will obtain labs today as noted.    Relevant Orders    Testosterone, Free, Total    DHEA-Sulfate    Irregular menses      Patient with irregular menses as noted.  Will obtain thyroid function test.  I discussed with the patient the various treatment options for management of her symptoms.  Patient is desiring Mirena IUD as noted.    Relevant Medications    levonorgestrel (Mirena, 52 MG,) 20 MCG/24HR IUD    Other Relevant Orders    TSH    T4, Free    T3, Free    Encounter for other contraceptive management      Contraceptive counseling was provided regarding long-acting reversible contraception.  The patient was informed that intrauterine devices and contraceptives implants, long-acting " reversible contraceptives (LARC), are the most effective reversible contraceptive methods.  The patient was informed there are five IUDs currently on the market in the [place]: the copper-containing IUD and four levonorgestrel-releasing IUDs.  The use of LARCs has increased during the past decade and the unintended pregnancy rate has decreased in part secondary to LARC use.  Continuation rates are higher as well in comparison to those who chose short-acting methods of contraception.  The unintended pregnancy rate for first year of use is less than 1 per 100 women; the implant is 0.05% and 0.2% for the levonorgestrel 52 IUD.  The unintended pregnancy rate first year of use for the copper T IUD is 0.8% but it has a higher discontinuation rate secondary to heavy menstrual bleeding and pain.  The patient was informed regarding the non-contraceptive benefits as well.  Benefits of the LNG IUD include reduction in heavy menstrual bleeding, anemia, dysmenorrhea, endometriosis-related pain, endometrial hyperplasia, endometrial cancer, ovarian cancer, and cervical cancer.  The patient was informed the LNG 52 IUD is FDA approved for management of heavy menstrual bleeding. Non-contraceptive benefits for the implant include generally lighter menstrual bleeding, although irregular, and improvement in dysmenorrhea.  The patient was also informed regarding the duration of use of each with the Mirena IUD now being approved by the FDA for seven years of use for contraception.  The risks and complications associated with each device were also discussed.  Patient is to follow-up for placement as discussed.    Relevant Medications    levonorgestrel (Mirena, 52 MG,) 20 MCG/24HR IUD          Follow Up/Instructions:  Follow up as noted.  Patient was given instructions and counseling regarding her condition or for health maintenance advice. Please see specific information pulled into the AVS if appropriate.     Note: Speech recognition  transcription software may have been used to dictate portions of this document.  An attempt at proofreading has been made though minor errors in transcription may still be present.    This note was electronically signed.  Ana Rosa Wesley M.D.

## 2022-02-16 LAB
DHEA-S SERPL-MCNC: 588 UG/DL (ref 84.8–378)
ESTRADIOL SERPL-MCNC: 74.1 PG/ML
FSH SERPL-ACNC: 1.8 MIU/ML
T4 FREE SERPL-MCNC: 1.24 NG/DL (ref 0.93–1.7)
TESTOST FREE SERPL-MCNC: 5.6 PG/ML (ref 0–4.2)
TESTOST SERPL-MCNC: 50 NG/DL (ref 13–71)
TSH SERPL DL<=0.005 MIU/L-ACNC: 0.88 UIU/ML (ref 0.27–4.2)

## 2022-02-17 DIAGNOSIS — E28.1 ELEVATED ANDROGEN LEVELS: Primary | ICD-10-CM

## 2022-02-22 ENCOUNTER — HOSPITAL ENCOUNTER (OUTPATIENT)
Dept: CT IMAGING | Facility: HOSPITAL | Age: 27
Discharge: HOME | End: 2022-02-22
Admitting: OBSTETRICS & GYNECOLOGY

## 2022-02-22 DIAGNOSIS — E28.1 ELEVATED ANDROGEN LEVELS: ICD-10-CM

## 2022-02-22 PROCEDURE — 74176 CT ABD & PELVIS W/O CONTRAST: CPT

## 2022-02-28 DIAGNOSIS — L68.0 FEMALE HIRSUTISM: ICD-10-CM

## 2022-02-28 DIAGNOSIS — E28.1 ELEVATED ANDROGEN LEVELS: Primary | ICD-10-CM

## 2022-03-03 ENCOUNTER — LAB (OUTPATIENT)
Dept: LAB | Facility: HOSPITAL | Age: 27
End: 2022-03-03

## 2022-03-03 PROCEDURE — 84403 ASSAY OF TOTAL TESTOSTERONE: CPT | Performed by: OBSTETRICS & GYNECOLOGY

## 2022-03-03 PROCEDURE — 82627 DEHYDROEPIANDROSTERONE: CPT | Performed by: OBSTETRICS & GYNECOLOGY

## 2022-03-03 PROCEDURE — 84481 FREE ASSAY (FT-3): CPT | Performed by: OBSTETRICS & GYNECOLOGY

## 2022-03-03 PROCEDURE — 82533 TOTAL CORTISOL: CPT | Performed by: OBSTETRICS & GYNECOLOGY

## 2022-03-03 PROCEDURE — 84402 ASSAY OF FREE TESTOSTERONE: CPT | Performed by: OBSTETRICS & GYNECOLOGY

## 2022-03-04 DIAGNOSIS — R79.89 HIGH SERUM DEHYDROEPIANDROSTERONE (DHEA): ICD-10-CM

## 2022-03-04 DIAGNOSIS — L68.0 FEMALE HIRSUTISM: ICD-10-CM

## 2022-03-04 DIAGNOSIS — E28.1 ELEVATED ANDROGEN LEVELS: Primary | ICD-10-CM

## 2022-04-19 ENCOUNTER — OFFICE VISIT (OUTPATIENT)
Dept: ENDOCRINOLOGY | Facility: CLINIC | Age: 27
End: 2022-04-19

## 2022-04-19 VITALS
HEIGHT: 66 IN | WEIGHT: 176 LBS | OXYGEN SATURATION: 100 % | HEART RATE: 60 BPM | BODY MASS INDEX: 28.28 KG/M2 | SYSTOLIC BLOOD PRESSURE: 120 MMHG | DIASTOLIC BLOOD PRESSURE: 60 MMHG

## 2022-04-19 DIAGNOSIS — R68.89 ABNORMAL ENDOCRINE LABORATORY TEST FINDING: ICD-10-CM

## 2022-04-19 DIAGNOSIS — R79.89 LOW SERUM CORTISOL LEVEL: ICD-10-CM

## 2022-04-19 DIAGNOSIS — L68.0 HIRSUTISM: Primary | ICD-10-CM

## 2022-04-19 DIAGNOSIS — N92.6 MENSTRUAL PROBLEM: ICD-10-CM

## 2022-04-19 PROCEDURE — 99204 OFFICE O/P NEW MOD 45 MIN: CPT | Performed by: INTERNAL MEDICINE

## 2022-04-19 NOTE — PROGRESS NOTES
Chief Complaint   Patient presents with   • Hirsutism   • Abnormal Lab        New patient who is being seen in consultation regarding elevated DHEA-s at the request of No ref. provider found     HPI   Keyana Harrison is a 26 y.o. female who presents for evaluation of elevated DHEA-S.    Patient presents for DHEA-S, obtained during laboratory evaluation with GYN due to concern for unwanted hair growth.  She reports this growth has been longstanding and is most prominent on the upper neck and chin.  She reports that she has to remove hair on a daily basis.  She reports that growth accelerated following recent change in contraceptive use.  Patient reports that when this value was initially checked and elevated it was attributed to a recent illness.  She was referred for further evaluation when repeat value was again elevated.  She does report that she underwent a CT scan of the abdomen which was unremarkable.  She denies any history of elevated testosterone.  Patient reports intention to have Mirena IUD replaced given history of irregular periods and good response with this device.  She is not currently utilizing contraception.  Patient does report a history of ovarian cyst and irregular menses.  She has never been diagnosed with PCOS.    Patient does report a history of orthostatic hypotension and reports she has to be diligent in maintaining hydration to avoid dizziness.  She reports this was the reason  cortisol level was drawn previously.  She reports being told this was low but no further follow-up was recommended.  She denies recent weight loss, diarrhea.    Past Medical History:   Diagnosis Date   • Depression More than a year   • History of Papanicolaou smear of cervix 2015   • Ovarian cyst 07/2019     Past Surgical History:   Procedure Laterality Date   • FOOT SURGERY Right 2011    5th toe reattachment by Dr Diallo   • OTHER SURGICAL HISTORY      pin in toe      Family History   Problem Relation Age of Onset    • Polycystic ovary syndrome Mother    • Sjogren's syndrome Mother    • No Known Problems Father    • Endometriosis Sister    • No Known Problems Sister    • Bipolar disorder Brother    • ADD / ADHD Brother    • No Known Problems Brother    • No Known Problems Brother    • Asperger's syndrome Brother       Social History     Socioeconomic History   • Marital status: Single   Tobacco Use   • Smoking status: Never Smoker   • Smokeless tobacco: Never Used   Vaping Use   • Vaping Use: Never used   Substance and Sexual Activity   • Alcohol use: Yes     Alcohol/week: 2.0 standard drinks     Types: 2 Glasses of wine per week   • Drug use: No   • Sexual activity: Not Currently     Partners: Male     Birth control/protection: Condom     Comment: Birth control pills      Allergies   Allergen Reactions   • Amoxicillin Anaphylaxis      Current Outpatient Medications on File Prior to Visit   Medication Sig Dispense Refill   • levonorgestrel (Mirena, 52 MG,) 20 MCG/24HR IUD Take to prescribers office 1 each 0   • [DISCONTINUED] Apri 0.15-30 MG-MCG per tablet TAKE 1 TABLET BY MOUTH EVERY DAY 84 tablet 0     No current facility-administered medications on file prior to visit.        Review of Systems   Constitutional: Positive for appetite change. Negative for unexpected weight gain and unexpected weight loss.   HENT: Positive for sinus pressure. Negative for trouble swallowing and voice change.    Eyes: Negative for pain and visual disturbance.   Respiratory: Negative for cough and shortness of breath.    Cardiovascular: Negative for palpitations and leg swelling.   Gastrointestinal: Negative for constipation and diarrhea.   Endocrine: Positive for polyphagia and polyuria.   Genitourinary: Positive for menstrual problem and pelvic pain.   Musculoskeletal: Positive for back pain and myalgias.   Skin: Negative for dry skin and rash.   Allergic/Immunologic: Positive for environmental allergies.   Neurological: Positive for  "light-headedness and headache.   Psychiatric/Behavioral: Negative for depressed mood. The patient is nervous/anxious.        Vitals:    04/19/22 1058   BP: 120/60   BP Location: Right arm   Patient Position: Sitting   Cuff Size: Adult   Pulse: 60   SpO2: 100%   Weight: 79.8 kg (176 lb)   Height: 167.6 cm (66\")   Body mass index is 28.41 kg/m².     Physical Exam  Vitals reviewed.   Constitutional:       General: She is not in acute distress.     Appearance: Normal appearance.   HENT:      Head: Normocephalic and atraumatic.      Right Ear: Hearing normal.      Left Ear: Hearing normal.      Nose: Nose normal.   Eyes:      General: Lids are normal.      Conjunctiva/sclera: Conjunctivae normal.   Neck:      Thyroid: No thyromegaly or thyroid tenderness.   Cardiovascular:      Rate and Rhythm: Normal rate and regular rhythm.      Heart sounds: No murmur heard.  Pulmonary:      Effort: Pulmonary effort is normal.      Breath sounds: Normal breath sounds and air entry.   Abdominal:      General: Bowel sounds are normal.      Palpations: Abdomen is soft.      Tenderness: There is no abdominal tenderness.   Lymphadenopathy:      Head:      Right side of head: No submandibular adenopathy.      Left side of head: No submandibular adenopathy.   Skin:     General: Skin is dry.      Findings: No rash.   Neurological:      General: No focal deficit present.      Mental Status: She is alert.      Deep Tendon Reflexes: Reflexes are normal and symmetric.   Psychiatric:         Mood and Affect: Mood and affect normal.         Behavior: Behavior is cooperative.        Labs/Imaging  PROCEDURE: CT ABDOMEN PELVIS WO CONTRAST-     HISTORY: elevated dheas and free testosterone; evaluate for adrenal  adenoma; E28.1-Androgen excess     COMPARISON: None .     PROCEDURE: Axial images were obtained from the lung bases through the  pubic symphysis without intravenous contrast. .      FINDINGS:      ABDOMEN: The lung bases are clear. The heart " size is normal. The limited  noncontrast images of the liver are normal. The spleen is normal. No  adrenal masses are seen.  The pancreas has an unremarkable unenhanced  appearance.. The aorta is normal in caliber. There is no significant  free fluid or adenopathy.  There is no nephrolithiasis. There is no  hydronephrosis. Limited noncontrast images of the bowel are  unremarkable.     PELVIS: The appendix is normal. The urinary bladder is unremarkable.  There is no significant fluid or adenopathy.     IMPRESSION:  No evidence of an adrenal or ovarian lesion.     Latest Reference Range & Units 10/06/20 10:23 02/15/22 11:32 03/03/22 08:48   17-Hydroxyprogesterone ng/dL 31 [1]     Cortisol   mcg/dL   5.53   DHEA-Sulfate 84.8 - 378.0 ug/dL 422.0 (H) 588.0 (H) 534.0 (H)   FSH mIU/mL  1.8 [2]    Estradiol pg/mL  74.1 [3]    Testosterone, Total 13 - 71 ng/dL 28.3 50 51   Testosterone, Free 0.0 - 4.2 pg/mL 4.8 (H) 5.6 (H) 4.0   TSH Baseline 0.270 - 4.200 uIU/mL  0.881    Free T4 0.93 - 1.70 ng/dL  1.24 [4]    T3, Free 2.00 - 4.40 pg/mL   3.05   (H): Data is abnormally high  [1]                           Adult Female                              Follicular        15 -  70                              Luteal            35 - 290     [2]                     Adult Female:                         Follicular phase      3.5 -  12.5                         Ovulation phase       4.7 -  21.5                         Luteal phase          1.7 -   7.7                         Postmenopausal       25.8 - 134.8     [3]                     Adult Female:                         Follicular phase   12.5 -   166.0                         Ovulation phase    85.8 -   498.0                         Luteal phase       43.8 -   211.0                         Postmenopausal     <6.0 -    54.7                       Pregnancy                         1st trimester     215.0 - >4300.0   Roche ECLIA methodology     [4] Results may be falsely increased if  patient taking Biotin.    Assessment and Plan    Diagnoses and all orders for this visit:    1. Hirsutism (Primary)  -     Basic Metabolic Panel; Future  2. Abnormal endocrine laboratory test finding  -Patient noted to have elevated DHEA-S during evaluation of hirsutism  -Patient with coarse hair growth of the chin and upper neck.  Testosterone levels normal.  DHEA-S most recently 534, slightly decreased from previous.  Patient did have a CT abdomen which was negative for an adrenal or ovarian lesion.  -Discussed that once adrenal/ovarian tumors are excluded, the most common cause of elevated DHEA-S is PCOS.  Patient has not been evaluated for this in the past, see below  -Patient reports that she has had significant nausea with use of oral contraceptives in the past and does not wish to resume hormonal OCP.  She is planning to have Mirena placed with her next menstrual cycle.  Discussed that we could consider initiation of spironolactone once patient is back on contraception.  We will have to be cautious given history of orthostasis.  Discussed that spironolactone is a known teratogen and patient must utilize reliable contraception when taking this medication.  Plan to check BMP to evaluate serum potassium.  Patient will contact the office if she is interested in starting this medication.    3. Low serum cortisol level (HCC)   -Patient reports this was checked due to low blood pressure  -Plan to repeat 8 AM cortisol, discussed that this is less than 10, would recommend ACTH stimulation  -Patient denies recent steroid exposure  -     Cortisol - AM; Future    4. Menstrual problem   -Reviewed this is not likely to be related to elevated DHEA-S  -Discussed possible diagnosis of PCOS, plan to check 17 hydroxyprogesterone, prolactin along with repeat labs   -Patient planning to have Mirena IUD replaced by GYN  -     17-Hydroxyprogesterone; Future  -Prolactin         Return in about 4 months (around 8/19/2022). The  patient was instructed to contact the clinic with any interval questions or concerns.    Silva Browning MD     Dictated Utilizing Dragon Dictation

## 2022-08-23 ENCOUNTER — OFFICE VISIT (OUTPATIENT)
Dept: ENDOCRINOLOGY | Facility: CLINIC | Age: 27
End: 2022-08-23

## 2022-08-23 VITALS
SYSTOLIC BLOOD PRESSURE: 118 MMHG | BODY MASS INDEX: 28.45 KG/M2 | OXYGEN SATURATION: 99 % | WEIGHT: 177 LBS | DIASTOLIC BLOOD PRESSURE: 60 MMHG | HEART RATE: 70 BPM | HEIGHT: 66 IN

## 2022-08-23 DIAGNOSIS — L68.0 HIRSUTISM: ICD-10-CM

## 2022-08-23 DIAGNOSIS — N92.6 MENSTRUAL CYCLE PROBLEM: ICD-10-CM

## 2022-08-23 DIAGNOSIS — R79.89 LOW SERUM CORTISOL LEVEL: Primary | ICD-10-CM

## 2022-08-23 PROCEDURE — 99212 OFFICE O/P EST SF 10 MIN: CPT | Performed by: INTERNAL MEDICINE

## 2022-08-23 NOTE — PROGRESS NOTES
"Chief Complaint   Patient presents with   • Abnormal Lab        HPI   Keyana Harrison is a 27 y.o. female had concerns including Abnormal Lab.      Patient reports no significant health changes in the interim since her last visit.  She reports she has been unable to complete testing ordered following last visit due to constraints with her work schedule.  She does believe she still has a copy of these orders.  She reports that she has deferred having Mirena replaced until laboratory evaluation is complete.  She reports no changes in menses or hair growth.  She continues to have intermittent dizziness with sudden change in position.    The following portions of the patient's history were reviewed and updated as appropriate: allergies, current medications and past social history.    Review of Systems   Constitutional: Negative for unexpected weight gain and unexpected weight loss.   Gastrointestinal: Negative for constipation and diarrhea.   Genitourinary: Positive for menstrual problem.   Neurological: Positive for dizziness.      /60 (BP Location: Right arm, Patient Position: Sitting, Cuff Size: Adult)   Pulse 70   Ht 167.6 cm (66\")   Wt 80.3 kg (177 lb)   SpO2 99%   BMI 28.57 kg/m²      Physical Exam      Constitutional:  well developed; well nourished  no acute distress  appears stated age   ENT/Thyroid: not examined   Eyes: Conjunctiva: clear   Respiratory:  breathing is unlabored  clear to auscultation bilaterally   Cardiovascular:  regular rate and rhythm   Chest:  Not performed.   Abdomen: soft, non-tender; no masses   : Not performed.   Musculoskeletal: Not performed   Skin: not performed.   Neuro: mental status, speech normal   Psych: mood and affect are within normal limits       Labs/Imaging  No interval lab testing completed    Diagnoses and all orders for this visit:    1. Low serum cortisol level (HCC) (Primary)  Per patient report, checked secondary to hypotension.  Patient does continue to " have intermittent dizziness.  She denies any recent steroid exposure.  Patient to complete 8 AM cortisol.    2. Menstrual cycle problem  -Reviewed possible diagnosis of PCOS, we will plan to complete laboratory evaluation to rule out other hormonal causes of irregular menses.  -     17-Hydroxyprogesterone; Future  -     Prolactin; Future    3. Hirsutism  Patient with elevated DHEA-S.  CT abdomen was negative for ovarian or adrenal lesion.  We are evaluating patient for PCOS, as above.  Patient does not desire use of oral contraceptives given history of nausea with these medications previously.  She is still considering placement of Mirena, check BMP and consider use of spironolactone pending remainder of evaluation and placement of IUD.  Discussed that we would need to be cautious given intermittent hypotension.  -     Basic Metabolic Panel; Future    I spent 13 minutes caring for Keyana on this date of service. This time includes time spent by me in the following activities: reviewing tests, performing a medically appropriate examination and/or evaluation, ordering medications, tests, or procedures and documenting information in the medical record     Return in about 6 months (around 2/23/2023) for Next scheduled follow up. The patient was instructed to contact the clinic with any interval questions or concerns.    Silva Browning MD   Endocrinologist    Dictated Utilizing Dragon Dictation

## 2023-02-21 ENCOUNTER — LAB (OUTPATIENT)
Dept: LAB | Facility: HOSPITAL | Age: 28
End: 2023-02-21
Payer: COMMERCIAL

## 2023-02-21 DIAGNOSIS — R79.89 LOW SERUM CORTISOL LEVEL: ICD-10-CM

## 2023-02-21 DIAGNOSIS — N92.6 MENSTRUAL CYCLE PROBLEM: ICD-10-CM

## 2023-02-21 DIAGNOSIS — N92.6 MENSTRUAL PROBLEM: ICD-10-CM

## 2023-02-21 DIAGNOSIS — L68.0 HIRSUTISM: ICD-10-CM

## 2023-02-21 LAB
ANION GAP SERPL CALCULATED.3IONS-SCNC: 6.2 MMOL/L (ref 5–15)
BUN SERPL-MCNC: 14 MG/DL (ref 6–20)
BUN/CREAT SERPL: 17.1 (ref 7–25)
CALCIUM SPEC-SCNC: 9.4 MG/DL (ref 8.6–10.5)
CHLORIDE SERPL-SCNC: 106 MMOL/L (ref 98–107)
CO2 SERPL-SCNC: 25.8 MMOL/L (ref 22–29)
CORTIS SERPL-MCNC: 16.1 MCG/DL
CREAT SERPL-MCNC: 0.82 MG/DL (ref 0.57–1)
EGFRCR SERPLBLD CKD-EPI 2021: 100.7 ML/MIN/1.73
GLUCOSE SERPL-MCNC: 82 MG/DL (ref 65–99)
POTASSIUM SERPL-SCNC: 4.6 MMOL/L (ref 3.5–5.2)
PROLACTIN SERPL-MCNC: 19.9 NG/ML (ref 4.79–23.3)
SODIUM SERPL-SCNC: 138 MMOL/L (ref 136–145)

## 2023-02-21 PROCEDURE — 82533 TOTAL CORTISOL: CPT

## 2023-02-21 PROCEDURE — 80048 BASIC METABOLIC PNL TOTAL CA: CPT

## 2023-02-21 PROCEDURE — 36415 COLL VENOUS BLD VENIPUNCTURE: CPT

## 2023-02-21 PROCEDURE — 84146 ASSAY OF PROLACTIN: CPT

## 2023-02-21 PROCEDURE — 83498 ASY HYDROXYPROGESTERONE 17-D: CPT

## 2023-02-24 ENCOUNTER — OFFICE VISIT (OUTPATIENT)
Dept: ENDOCRINOLOGY | Facility: CLINIC | Age: 28
End: 2023-02-24
Payer: COMMERCIAL

## 2023-02-24 VITALS
DIASTOLIC BLOOD PRESSURE: 60 MMHG | SYSTOLIC BLOOD PRESSURE: 114 MMHG | BODY MASS INDEX: 29.09 KG/M2 | HEIGHT: 66 IN | OXYGEN SATURATION: 100 % | HEART RATE: 70 BPM | WEIGHT: 181 LBS

## 2023-02-24 DIAGNOSIS — N92.6 IRREGULAR MENSES: ICD-10-CM

## 2023-02-24 DIAGNOSIS — L68.0 HIRSUTISM: ICD-10-CM

## 2023-02-24 DIAGNOSIS — R79.89 LOW SERUM CORTISOL LEVEL: Primary | ICD-10-CM

## 2023-02-24 PROCEDURE — 99212 OFFICE O/P EST SF 10 MIN: CPT | Performed by: INTERNAL MEDICINE

## 2023-02-24 NOTE — PROGRESS NOTES
"Chief Complaint   Patient presents with   • Low serum cortisol level         HPI   Keyana Harrison is a 27 y.o. female had concerns including Low serum cortisol level .      Patient reports no significant health changes in the interim since her last visit.  She reports that she still plans to have Mirena IUD placed and this has not yet taken place that she was told she needs a Pap prior to placement has had difficulty getting this scheduled.  She did complete labs earlier this week and available results were reviewed.  She reports that dizziness is improved from prior.  She does still have some concerns regarding unwanted hair growth, cystic acne and irregular menstrual cycles.    The following portions of the patient's history were reviewed and updated as appropriate: allergies, current medications and past social history.    Review of Systems   Constitutional: Negative for unexpected weight gain and unexpected weight loss.   Genitourinary: Positive for menstrual problem.   Neurological: Positive for dizziness.      /60 (BP Location: Right arm, Patient Position: Sitting, Cuff Size: Adult)   Pulse 70   Ht 167.6 cm (66\")   Wt 82.1 kg (181 lb)   SpO2 100%   BMI 29.21 kg/m²      Physical Exam      Constitutional:  well developed; well nourished  no acute distress   ENT/Thyroid: not examined   Eyes: Conjunctiva: clear   Respiratory:  breathing is unlabored  clear to auscultation bilaterally   Cardiovascular:  regular rate and rhythm   Chest:  Not performed.   Abdomen: Not performed.   : Not performed.   Musculoskeletal: Not performed   Skin: not performed.   Neuro: normal without focal findings and mental status, speech normal   Psych: mood and affect are within normal limits       Labs/Imaging   Latest Reference Range & Units 02/21/23 07:46   Glucose 65 - 99 mg/dL 82   Sodium 136 - 145 mmol/L 138   Potassium 3.5 - 5.2 mmol/L 4.6   CO2 22.0 - 29.0 mmol/L 25.8   Chloride 98 - 107 mmol/L 106   Anion Gap 5.0 - " 15.0 mmol/L 6.2   Creatinine 0.57 - 1.00 mg/dL 0.82   BUN 6 - 20 mg/dL 14   BUN/Creatinine Ratio 7.0 - 25.0  17.1   Calcium 8.6 - 10.5 mg/dL 9.4   eGFR >60.0 mL/min/1.73 100.7   Cortisol mcg/dL 16.10   Prolactin 4.79 - 23.30 ng/mL 19.90       Diagnoses and all orders for this visit:    1. Low serum cortisol level (Primary)  Recent cortisol obtained at 8 AM was 16.1, sufficient to rule out adrenal insufficiency.  Reviewed with patient no further investigation is required.    2. Irregular menses  Reviewed possibility of diagnosis of PCOS.  Prior thyroid function testing normal, recent prolactin normal.  17 hydroxyprogesterone is pending.  Patient planning for Mirena placement with GYN.    3. Hirsutism  Patient with elevated DHEA-S, testosterone previously normal. CT abdomen was negative for ovarian or adrenal lesion.  We are evaluating patient for PCOS, as above.    Discussed with patient that we could consider use of spironolactone once IUD in place.  She will contact the clinic if she is interested in this.  Reviewed potential for hypotension with this drug.  Recent BMP with normal electrolytes and kidney function.     I spent 15 minutes caring for Keyana on this date of service. This time includes time spent by me in the following activities: reviewing tests, performing a medically appropriate examination and/or evaluation, counseling and educating the patient/family/caregiver and documenting information in the medical record    Return in about 6 months (around 8/24/2023) for Next scheduled follow up. The patient was instructed to contact the clinic with any interval questions or concerns.    Silva Browning MD   Endocrinologist    Dictated Utilizing Dragon Dictation

## 2023-02-25 LAB — 17OHP SERPL-MCNC: 38 NG/DL

## 2023-03-07 ENCOUNTER — OFFICE VISIT (OUTPATIENT)
Dept: OBSTETRICS AND GYNECOLOGY | Facility: CLINIC | Age: 28
End: 2023-03-07
Payer: COMMERCIAL

## 2023-03-07 VITALS
WEIGHT: 180.8 LBS | SYSTOLIC BLOOD PRESSURE: 110 MMHG | HEIGHT: 66 IN | DIASTOLIC BLOOD PRESSURE: 66 MMHG | BODY MASS INDEX: 29.06 KG/M2

## 2023-03-07 DIAGNOSIS — E28.2 POLYCYSTIC OVARY SYNDROME: ICD-10-CM

## 2023-03-07 DIAGNOSIS — Z11.3 ROUTINE SCREENING FOR STI (SEXUALLY TRANSMITTED INFECTION): ICD-10-CM

## 2023-03-07 DIAGNOSIS — Z12.4 SCREENING FOR CERVICAL CANCER: ICD-10-CM

## 2023-03-07 DIAGNOSIS — Z01.419 ROUTINE GYNECOLOGICAL EXAMINATION: Primary | ICD-10-CM

## 2023-03-07 DIAGNOSIS — Z30.014 ENCOUNTER FOR INITIAL PRESCRIPTION OF INTRAUTERINE CONTRACEPTIVE DEVICE (IUD): ICD-10-CM

## 2023-03-07 PROCEDURE — 99395 PREV VISIT EST AGE 18-39: CPT | Performed by: PHYSICIAN ASSISTANT

## 2023-03-07 RX ORDER — LEVONORGESTREL 19.5 MG/1
1 INTRAUTERINE DEVICE INTRAUTERINE ONCE
Qty: 1 EACH | Refills: 0 | Status: SHIPPED | OUTPATIENT
Start: 2023-03-07 | End: 2023-03-07

## 2023-03-07 NOTE — PROGRESS NOTES
Subjective   Chief Complaint   Patient presents with   • Gynecologic Exam     Last pap done 10/6/20-WNL, Discuss getting an IUD, No complaints       Keyana Harrison is a 27 y.o. year old  presenting to be seen for her annual gynecological exam. Not currently sexually active but desiring to get IUD. LMP 2023.  Cycles irregular and occur q 2-6 weeks. Hx of elevated DHEAS and testosterone levels c/w PCOS. Has seen endocrinology which ruled out adrenal tumor  LMP 2023  No complaints today      Past Medical History:   Diagnosis Date   • Depression More than a year   • History of Papanicolaou smear of cervix    • Ovarian cyst 2019   • Polycystic ovary syndrome 3/7/2023        Current Outpatient Medications:   •  levonorgestrel (Kyleena) 19.5 MG intrauterine device IUD, Take to provider's office, Disp: 1 each, Rfl: 0   Allergies   Allergen Reactions   • Amoxicillin Anaphylaxis      Past Surgical History:   Procedure Laterality Date   • FOOT SURGERY Right     5th toe reattachment by Dr Diallo   • OTHER SURGICAL HISTORY      pin in toe      Social History     Socioeconomic History   • Marital status: Single   Tobacco Use   • Smoking status: Never   • Smokeless tobacco: Never   Vaping Use   • Vaping Use: Never used   Substance and Sexual Activity   • Alcohol use: Yes     Alcohol/week: 2.0 standard drinks     Types: 2 Glasses of wine per week   • Drug use: No   • Sexual activity: Not Currently     Partners: Male     Birth control/protection: Condom     Comment: Birth control pills      Family History   Problem Relation Age of Onset   • Polycystic ovary syndrome Mother    • Sjogren's syndrome Mother    • Hypertension Father    • Endometriosis Sister    • No Known Problems Sister    • Bipolar disorder Brother    • ADD / ADHD Brother    • No Known Problems Brother    • No Known Problems Brother    • Asperger's syndrome Brother        Review of Systems   Constitutional: Negative for chills, diaphoresis  "and fever.   Gastrointestinal: Negative.    Genitourinary: Negative for difficulty urinating, dysuria, pelvic pain and vaginal discharge.           Objective   /66   Ht 167.6 cm (66\")   Wt 82 kg (180 lb 12.8 oz)   LMP 02/17/2023 (Exact Date)   BMI 29.18 kg/m²     Physical Exam  Exam conducted with a chaperone present.   Constitutional:       Appearance: Normal appearance. She is well-developed and well-groomed.   Eyes:      General: Lids are normal.      Extraocular Movements: Extraocular movements intact.      Conjunctiva/sclera: Conjunctivae normal.   Chest:   Breasts:     Breasts are symmetrical.      Right: No inverted nipple, mass, nipple discharge, skin change or tenderness.      Left: No inverted nipple, mass, nipple discharge, skin change or tenderness.   Abdominal:      Palpations: Abdomen is soft.      Tenderness: There is no abdominal tenderness.   Genitourinary:     Labia:         Right: No rash, tenderness or lesion.         Left: No rash, tenderness or lesion.       Urethra: No prolapse, urethral pain, urethral swelling or urethral lesion.      Vagina: No vaginal discharge, tenderness or lesions.      Cervix: No cervical motion tenderness, discharge, friability or lesion.      Uterus: Not enlarged and not tender.       Adnexa:         Right: No mass or tenderness.          Left: No mass or tenderness.     Lymphadenopathy:      Upper Body:      Right upper body: No axillary adenopathy.      Left upper body: No axillary adenopathy.   Skin:     General: Skin is warm and dry.      Findings: No bruising or lesion.   Neurological:      General: No focal deficit present.      Mental Status: She is alert and oriented to person, place, and time.   Psychiatric:         Attention and Perception: Attention normal.         Mood and Affect: Mood normal.         Speech: Speech normal.         Behavior: Behavior is cooperative.            Result Review :                   Assessment and Plan  Diagnoses and " all orders for this visit:    1. Routine gynecological examination (Primary)    2. Screening for cervical cancer  -     LIQUID-BASED PAP SMEAR WITH HPV GENOTYPING IF ASCUS ([facility])    3. Routine screening for STI (sexually transmitted infection)    4. Polycystic ovary syndrome    5. Encounter for initial prescription of intrauterine contraceptive device (IUD)    Other orders  -     levonorgestrel (Kyleena) 19.5 MG intrauterine device IUD; Take to provider's office  Dispense: 1 each; Refill: 0      Patient Instructions   Self breast exam monthly  Regular exercise    Once Kyleena arrives in office call first day of menses to schedule Kyleena             This note was electronically signed.    Vani Alexandre PA-C   March 7, 2023

## 2023-03-09 LAB — REF LAB TEST METHOD: NORMAL

## 2023-05-15 ENCOUNTER — OFFICE VISIT (OUTPATIENT)
Dept: OBSTETRICS AND GYNECOLOGY | Facility: CLINIC | Age: 28
End: 2023-05-15
Payer: COMMERCIAL

## 2023-05-15 VITALS
BODY MASS INDEX: 28.16 KG/M2 | DIASTOLIC BLOOD PRESSURE: 66 MMHG | HEIGHT: 66 IN | SYSTOLIC BLOOD PRESSURE: 112 MMHG | WEIGHT: 175.2 LBS

## 2023-05-15 DIAGNOSIS — Z30.430 ENCOUNTER FOR IUD INSERTION: Primary | ICD-10-CM

## 2023-05-15 LAB
B-HCG UR QL: NEGATIVE
EXPIRATION DATE: NORMAL
INTERNAL NEGATIVE CONTROL: NEGATIVE
INTERNAL POSITIVE CONTROL: POSITIVE
Lab: NORMAL

## 2023-05-15 NOTE — PROGRESS NOTES
"Subjective   Chief Complaint   Patient presents with   • Contraception     IUD insertion, device supplied from our stock, UPT-Negative       Keyana Harrison is a 27 y.o. year old  presenting to be seen for Kyleena insertion  Recent pap normal and negative STI screening  Has no complaints or concerns      Past Medical History:   Diagnosis Date   • Depression More than a year   • History of Papanicolaou smear of cervix    • Ovarian cyst 2019   • Polycystic ovary syndrome 3/7/2023        Current Outpatient Medications:   •  levonorgestrel (Kyleena) 19.5 MG intrauterine device IUD, Take to provider's office, Disp: 1 each, Rfl: 0    Current Facility-Administered Medications:   •  levonorgestrel (KYLEENA) 19.5 MG IUD 1 each, 1 each, Intrauterine, Once, Vani Alexandre PA-C   Allergies   Allergen Reactions   • Amoxicillin Anaphylaxis      Past Surgical History:   Procedure Laterality Date   • FOOT SURGERY Right     5th toe reattachment by Dr Diallo   • OTHER SURGICAL HISTORY      pin in toe      Social History     Socioeconomic History   • Marital status: Single   Tobacco Use   • Smoking status: Never   • Smokeless tobacco: Never   Vaping Use   • Vaping Use: Never used   Substance and Sexual Activity   • Alcohol use: Yes     Alcohol/week: 2.0 standard drinks     Types: 2 Glasses of wine per week   • Drug use: No   • Sexual activity: Not Currently     Partners: Male     Birth control/protection: Condom, I.U.D.     Comment: Birth control pills      Family History   Problem Relation Age of Onset   • Polycystic ovary syndrome Mother    • Sjogren's syndrome Mother    • Hypertension Father    • Endometriosis Sister    • No Known Problems Sister    • Bipolar disorder Brother    • ADD / ADHD Brother    • No Known Problems Brother    • No Known Problems Brother    • Asperger's syndrome Brother        Review of Systems        Objective   /66   Ht 167.6 cm (66\")   Wt 79.5 kg (175 lb 3.2 oz)   LMP " 05/10/2023   BMI 28.28 kg/m²     Physical Exam       Result Review :                   Assessment and Plan  Diagnoses and all orders for this visit:    1. Encounter for IUD insertion (Primary)  -     levonorgestrel (KYLEENA) 19.5 MG IUD 1 each  -     POC Pregnancy, Urine      There are no Patient Instructions on file for this visit.           This note was electronically signed.    Vani Alexandre PA-C   May 15, 2023

## 2023-05-15 NOTE — PROGRESS NOTES
IUD Insertion    Patient's last menstrual period was 05/10/2023.    Date of procedure:  5/15/2023    Risks and benefits discussed? yes  All questions answered? yes  Consents given by The patient  Written consent obtained? yes    Local anesthesia used:  no    Procedure documentation:    After verifying the patient had a low probability of being pregnant and met the criteria for insertion, a sterile speculum has placed and the cervix was cleansed with an antiseptic solution.  Vaginal discharge was scant.  The anterior lip of the cervix was grasped with a tenaculum and the uterine cavity was gently sounded. There was no difficulty passing the sound through the cervix.  Cervical dilation did not need to be performed prior to placing the IUD.  The uterus was anteverted and sounded to 6 cms.  The Kyleena IUD was then prepared per the manufacturers instructions.    The Kyleena was advanced to a point 2 cms from the fundus and then the arms were released from the sheath.  The device was advanced to the fundus and the device was released fully from the sheath.. The string was cut 3 cms in length.  Bleeding from the cervix was scant.    She tolerated the procedure without any difficulty.     Post procedure instructions:  Follow up in 5 weeks for IUD check. Call office if any heavy bleeding, significant pain or cramping, fever or chills. No intercourse x 7 days.   It was reviewed that the Kyleena will not alter the timing of when she bleeds but it may decrease the quantity of flow and cramps.  Roughly 30% of people will be amenorrheic over time.  Efficacy rate of 99.2% over 5 years was discussed.     This note was electronically signed.    Vani Alexandre PA-C  May 15, 2023

## 2023-06-02 ENCOUNTER — OFFICE VISIT (OUTPATIENT)
Dept: INTERNAL MEDICINE | Facility: CLINIC | Age: 28
End: 2023-06-02

## 2023-06-02 VITALS
BODY MASS INDEX: 28.61 KG/M2 | TEMPERATURE: 99.1 F | HEIGHT: 66 IN | HEART RATE: 81 BPM | WEIGHT: 178 LBS | DIASTOLIC BLOOD PRESSURE: 64 MMHG | OXYGEN SATURATION: 97 % | SYSTOLIC BLOOD PRESSURE: 120 MMHG

## 2023-06-02 DIAGNOSIS — Z76.89 ENCOUNTER TO ESTABLISH CARE: Primary | ICD-10-CM

## 2023-06-02 DIAGNOSIS — I83.892 SYMPTOMATIC VARICOSE VEINS, LEFT: ICD-10-CM

## 2023-06-02 NOTE — PROGRESS NOTES
Date: 2023    Name: Keyana Harrison  : 1995    Chief Complaint:   Chief Complaint   Patient presents with    [facility]           Leg Pain     Intermittent leg pain L leg, possible varicose vein       HPI:  Keyana Harrison is a 27 y.o. female presenting to [facility].    She has had intermittent left leg pain since 2023. She had a possible varicose vein that appeared in her left upper thigh and over time, she feels like the vein has moved down her left leg. At one point in time that she had a bruise on her left thigh around the vein, but that has since resolved. She is currently not having any pain, occurs intermittently. She has no personal or family history of DVTs, PEs, or hematological disorders. There has been no injury to that site and there is no redness or erythema. She has been wearing compression socks but unsure if this is helping.  She has no dyspnea but has had random intermittent fleeting chest pains all her life but nothing that has happened/worsened since the left leg pain; it lasts for less than a minute and then goes away. She denies dyspnea on exertion, diaphoresis, visual changes, headaches, or syncope associated with it.  Her last menstrual period was 05/10/2023. She is not currently sexually active. She had a Pap smear on 2023 that was normal. She is a  0, para 0. She sees endocrinology for a history of low serum cortisol levels, irregular menses, and was being worked up for a diagnosis of PCOS, which she sees the gynecologist for. She currently has an IUD, Kyleena, and no other medications.   She doesn't take any vitamins or supplements. She is not up to date on annual dental and eye examinations but will schedule. She exercises 4 to 5 times weekly, CrossFit, and goes to a private gym. She eats a balanced diet and does not eat out or snack frequently. She mostly cooks her own food at home.    History:    Past Medical History:   Diagnosis Date    Depression  "More than a year    History of Papanicolaou smear of cervix 2015    Ovarian cyst 07/2019    Polycystic ovary syndrome 3/7/2023       Past Surgical History:   Procedure Laterality Date    FOOT SURGERY Right 2011    5th toe reattachment by Dr Diallo    OTHER SURGICAL HISTORY      pin in toe       Family History   Problem Relation Age of Onset    Polycystic ovary syndrome Mother     Sjogren's syndrome Mother     Hypertension Father     Endometriosis Sister     No Known Problems Sister     Bipolar disorder Brother     ADD / ADHD Brother     No Known Problems Brother     No Known Problems Brother     Asperger's syndrome Brother        Social History     Socioeconomic History    Marital status: Single   Tobacco Use    Smoking status: Never    Smokeless tobacco: Never   Vaping Use    Vaping Use: Never used   Substance and Sexual Activity    Alcohol use: Yes     Alcohol/week: 2.0 standard drinks     Types: 2 Glasses of wine per week    Drug use: No    Sexual activity: Not Currently     Partners: Male     Birth control/protection: Condom, I.U.D.     Comment: Birth control pills       Allergies   Allergen Reactions    Amoxicillin Anaphylaxis         Current Outpatient Medications:     levonorgestrel (Kyleena) 19.5 MG intrauterine device IUD, Take to provider's office, Disp: 1 each, Rfl: 0        VS:  Vitals:    06/02/23 1613   BP: 120/64   BP Location: Left arm   Patient Position: Sitting   Cuff Size: Adult   Pulse: 81   Temp: 99.1 °F (37.3 °C)   TempSrc: Temporal   SpO2: 97%   Weight: 80.7 kg (178 lb)   Height: 166.4 cm (65.5\")     Body mass index is 29.17 kg/m².    PE:  Physical Exam  Vitals and nursing note reviewed.   Constitutional:       Appearance: Normal appearance. She is not toxic-appearing or diaphoretic.   HENT:      Head: Normocephalic and atraumatic.   Eyes:      Extraocular Movements: Extraocular movements intact.   Neck:      Vascular: No carotid bruit.   Cardiovascular:      Rate and Rhythm: Normal rate and " regular rhythm.      Pulses:           Dorsalis pedis pulses are 2+ on the right side and 2+ on the left side.        Posterior tibial pulses are 2+ on the right side and 2+ on the left side.      Heart sounds: Normal heart sounds, S1 normal and S2 normal. No murmur heard.     Comments: No erythema, warmth or edema noted to LLE, enlarged varicose veins noted LLE, no discoloration   Pulmonary:      Effort: Pulmonary effort is normal.      Breath sounds: Normal breath sounds.   Abdominal:      General: There is no abdominal bruit.   Musculoskeletal:         General: Normal range of motion.      Cervical back: Normal range of motion and neck supple.      Right lower leg: No edema.      Left lower leg: No edema.   Skin:     General: Skin is warm and dry.      Capillary Refill: Capillary refill takes less than 2 seconds.      Coloration: Skin is not jaundiced.      Findings: No erythema.   Neurological:      General: No focal deficit present.      Mental Status: She is alert and oriented to person, place, and time.   Psychiatric:         Mood and Affect: Mood normal.         Behavior: Behavior normal.         Thought Content: Thought content normal.         Judgment: Judgment normal.     Results Review:       Assessment/Plan:         Diagnoses and all orders for this visit:    1. Encounter to establish care (Primary)    2. Symptomatic varicose veins, left  -     Cancel: US venous doppler lower extremity left (duplex)  -     US venous doppler lower extremity left (duplex)    Symptomatic varicose veins off the left lower extremity  Venous Doppler of the left lower extremity ordered. Recommended that she take baby aspirin 81 mg daily and apply warm compresses with a heating pad 15-20-minute intervals at least three times daily while keeping the left leg elevated.  If she is to develop any swelling, redness, or warmth to her left leg, chest pain, SOA she is to be seen emergently. She verbalized understanding and risks.        Return in about 4 weeks (around 6/30/2023).      Adali Piper     Transcribed from ambient dictation for PATRICIA Niño by Janna Bhatia.  06/02/23   20:14 EDT    I have personally performed the services described in this document as transcribed by the above individual, and it is both accurate and complete.      I spent 33 minutes caring for Keyana Harrison on this date of service. This time includes time spent by me in the following activities: preparing for the visit, reviewing tests, performing a medically appropriate examination and/or evaluation, counseling and educating the patient/family/caregiver, ordering medications, tests, or procedures and documenting information in the medical record.

## 2023-06-09 ENCOUNTER — PATIENT ROUNDING (BHMG ONLY) (OUTPATIENT)
Dept: INTERNAL MEDICINE | Facility: CLINIC | Age: 28
End: 2023-06-09
Payer: COMMERCIAL

## 2023-06-09 NOTE — PROGRESS NOTES
A Fliggo message has been sent to patient for PATIENT ROUNDING with [facility] [place].

## 2023-06-12 ENCOUNTER — OFFICE VISIT (OUTPATIENT)
Dept: OBSTETRICS AND GYNECOLOGY | Facility: CLINIC | Age: 28
End: 2023-06-12
Payer: COMMERCIAL

## 2023-06-12 VITALS
SYSTOLIC BLOOD PRESSURE: 110 MMHG | BODY MASS INDEX: 28.45 KG/M2 | HEIGHT: 66 IN | WEIGHT: 177 LBS | DIASTOLIC BLOOD PRESSURE: 72 MMHG

## 2023-06-12 DIAGNOSIS — Z30.431 ENCOUNTER FOR ROUTINE CHECKING OF INTRAUTERINE CONTRACEPTIVE DEVICE (IUD): Primary | ICD-10-CM

## 2023-06-12 PROCEDURE — 99212 OFFICE O/P EST SF 10 MIN: CPT | Performed by: PHYSICIAN ASSISTANT

## 2023-06-12 NOTE — PROGRESS NOTES
"Subjective   Chief Complaint   Patient presents with   • Follow-up     5 week follow-up IUD check, doing well       Keyana Harrison is a 27 y.o. year old  presenting to be seen for IUD check  Has done well post insertion  Light random bleeding post insertion  No complaints or concerns      Past Medical History:   Diagnosis Date   • Depression More than a year   • History of Papanicolaou smear of cervix    • Ovarian cyst 2019   • Polycystic ovary syndrome 3/7/2023        Current Outpatient Medications:   •  levonorgestrel (Kyleena) 19.5 MG intrauterine device IUD, Take to provider's office, Disp: 1 each, Rfl: 0   Allergies   Allergen Reactions   • Amoxicillin Anaphylaxis      Past Surgical History:   Procedure Laterality Date   • FOOT SURGERY Right     5th toe reattachment by Dr Diallo   • OTHER SURGICAL HISTORY      pin in toe      Social History     Socioeconomic History   • Marital status: Single   Tobacco Use   • Smoking status: Never   • Smokeless tobacco: Never   Vaping Use   • Vaping Use: Never used   Substance and Sexual Activity   • Alcohol use: Yes     Alcohol/week: 1.0 standard drink     Types: 1 Glasses of wine per week   • Drug use: No   • Sexual activity: Not Currently     Partners: Male     Birth control/protection: Condom, I.U.D.     Comment: Birth control pills      Family History   Problem Relation Age of Onset   • Polycystic ovary syndrome Mother    • Sjogren's syndrome Mother    • Hypertension Father    • Endometriosis Sister    • No Known Problems Sister    • Bipolar disorder Brother    • ADD / ADHD Brother    • No Known Problems Brother    • No Known Problems Brother    • Asperger's syndrome Brother        Review of Systems   Constitutional: Negative for chills, diaphoresis and fever.   Gastrointestinal: Negative.    Genitourinary: Negative for difficulty urinating and dysuria.           Objective   /72   Ht 167.6 cm (66\")   Wt 80.3 kg (177 lb)   BMI 28.57 kg/m² "     Physical Exam  Constitutional:       Appearance: Normal appearance. She is well-developed and well-groomed.   Eyes:      General: Lids are normal.      Extraocular Movements: Extraocular movements intact.      Conjunctiva/sclera: Conjunctivae normal.   Genitourinary:     Labia:         Right: No rash, tenderness or lesion.         Left: No rash, tenderness or lesion.       Urethra: No prolapse, urethral pain, urethral swelling or urethral lesion.      Comments: Scant dark blood observed  IUD strings 3 cm  Neurological:      Mental Status: She is alert.   Psychiatric:         Attention and Perception: Attention normal.         Mood and Affect: Mood normal.         Speech: Speech normal.         Behavior: Behavior is cooperative.            Result Review :                   Assessment and Plan  Diagnoses and all orders for this visit:    1. Encounter for routine checking of intrauterine contraceptive device (IUD) (Primary)      Patient Instructions   RTO 1 year or prn             This note was electronically signed.    Vani Alexandre PA-C   June 12, 2023

## 2023-06-15 ENCOUNTER — HOSPITAL ENCOUNTER (OUTPATIENT)
Dept: ULTRASOUND IMAGING | Facility: HOSPITAL | Age: 28
Discharge: HOME | End: 2023-06-15
Admitting: NURSE PRACTITIONER
Payer: COMMERCIAL

## 2023-06-15 PROCEDURE — 93971 EXTREMITY STUDY: CPT

## 2023-08-03 ENCOUNTER — TELEPHONE (OUTPATIENT)
Dept: INTERNAL MEDICINE | Facility: CLINIC | Age: 28
End: 2023-08-03

## 2023-08-03 NOTE — TELEPHONE ENCOUNTER
Spoke with patient, advised that due to her insurance she will need to contact the health department for this particular vaccine.

## 2023-08-03 NOTE — TELEPHONE ENCOUNTER
PATIENT WOULD LIKE APPOINTMENT TO GET VARICELLA ONLY.  NEEDS ORDERS.  FOR SCHOOL, CLINICALS.  NEEDS BY TUESDAY.      PLEASE CALL [phone]

## 2023-10-10 ENCOUNTER — TELEPHONE (OUTPATIENT)
Dept: INTERNAL MEDICINE | Facility: CLINIC | Age: 28
End: 2023-10-10

## 2023-10-10 DIAGNOSIS — Z11.1 TUBERCULOSIS SCREENING: Primary | ICD-10-CM

## 2023-10-13 LAB
GAMMA INTERFERON BACKGROUND BLD IA-ACNC: 0.05 IU/ML
M TB IFN-G BLD-IMP: NEGATIVE
M TB IFN-G CD4+ T-CELLS BLD-ACNC: 0.05 IU/ML
M TBIFN-G CD4+ CD8+T-CELLS BLD-ACNC: 0.05 IU/ML
MITOGEN IGNF BLD-ACNC: >10 IU/ML
QUANTIFERON INCUBATION: NORMAL
SERVICE CMNT-IMP: NORMAL

## 2024-07-16 ENCOUNTER — OFFICE VISIT (OUTPATIENT)
Dept: INTERNAL MEDICINE | Facility: CLINIC | Age: 29
End: 2024-07-16
Payer: MEDICAID

## 2024-07-16 VITALS
DIASTOLIC BLOOD PRESSURE: 72 MMHG | TEMPERATURE: 97.7 F | HEIGHT: 66 IN | WEIGHT: 185 LBS | HEART RATE: 65 BPM | OXYGEN SATURATION: 100 % | BODY MASS INDEX: 29.73 KG/M2 | SYSTOLIC BLOOD PRESSURE: 110 MMHG

## 2024-07-16 DIAGNOSIS — Z13.0 SCREENING FOR DEFICIENCY ANEMIA: ICD-10-CM

## 2024-07-16 DIAGNOSIS — Z00.00 ANNUAL PHYSICAL EXAM: Primary | ICD-10-CM

## 2024-07-16 DIAGNOSIS — Z11.1 TUBERCULOSIS SCREENING: ICD-10-CM

## 2024-07-16 DIAGNOSIS — Z13.220 SCREENING FOR CHOLESTEROL LEVEL: ICD-10-CM

## 2024-07-16 DIAGNOSIS — Z13.29 SCREENING FOR ENDOCRINE, METABOLIC AND IMMUNITY DISORDER: ICD-10-CM

## 2024-07-16 DIAGNOSIS — I83.892 SYMPTOMATIC VARICOSE VEINS OF LEFT LOWER EXTREMITY: ICD-10-CM

## 2024-07-16 DIAGNOSIS — Z13.228 SCREENING FOR ENDOCRINE, METABOLIC AND IMMUNITY DISORDER: ICD-10-CM

## 2024-07-16 DIAGNOSIS — L70.0 ACNE VULGARIS: ICD-10-CM

## 2024-07-16 DIAGNOSIS — Z13.0 SCREENING FOR ENDOCRINE, METABOLIC AND IMMUNITY DISORDER: ICD-10-CM

## 2024-07-16 LAB
ALBUMIN SERPL-MCNC: 4.4 G/DL (ref 3.5–5.2)
ALBUMIN/GLOB SERPL: 2 G/DL
ALP SERPL-CCNC: 46 U/L (ref 39–117)
ALT SERPL-CCNC: 16 U/L (ref 1–33)
AST SERPL-CCNC: 11 U/L (ref 1–32)
BILIRUB SERPL-MCNC: 0.8 MG/DL (ref 0–1.2)
BUN SERPL-MCNC: 14 MG/DL (ref 6–20)
BUN/CREAT SERPL: 18.2 (ref 7–25)
CALCIUM SERPL-MCNC: 9.2 MG/DL (ref 8.6–10.5)
CHLORIDE SERPL-SCNC: 105 MMOL/L (ref 98–107)
CHOLEST SERPL-MCNC: 141 MG/DL (ref 0–200)
CO2 SERPL-SCNC: 26.9 MMOL/L (ref 22–29)
CREAT SERPL-MCNC: 0.77 MG/DL (ref 0.57–1)
EGFRCR SERPLBLD CKD-EPI 2021: 107.9 ML/MIN/1.73
ERYTHROCYTE [DISTWIDTH] IN BLOOD BY AUTOMATED COUNT: 11.5 % (ref 12.3–15.4)
GLOBULIN SER CALC-MCNC: 2.2 GM/DL
GLUCOSE SERPL-MCNC: 84 MG/DL (ref 65–99)
HCT VFR BLD AUTO: 41.8 % (ref 34–46.6)
HDLC SERPL-MCNC: 69 MG/DL (ref 40–60)
HGB BLD-MCNC: 13.7 G/DL (ref 12–15.9)
LDLC SERPL CALC-MCNC: 62 MG/DL (ref 0–100)
MCH RBC QN AUTO: 32.3 PG (ref 26.6–33)
MCHC RBC AUTO-ENTMCNC: 32.8 G/DL (ref 31.5–35.7)
MCV RBC AUTO: 98.6 FL (ref 79–97)
PLATELET # BLD AUTO: 251 10*3/MM3 (ref 140–450)
POTASSIUM SERPL-SCNC: 4.7 MMOL/L (ref 3.5–5.2)
PROT SERPL-MCNC: 6.6 G/DL (ref 6–8.5)
RBC # BLD AUTO: 4.24 10*6/MM3 (ref 3.77–5.28)
SODIUM SERPL-SCNC: 139 MMOL/L (ref 136–145)
TRIGL SERPL-MCNC: 42 MG/DL (ref 0–150)
TSH SERPL DL<=0.005 MIU/L-ACNC: 1.57 UIU/ML (ref 0.27–4.2)
VLDLC SERPL CALC-MCNC: 10 MG/DL (ref 5–40)
WBC # BLD AUTO: 4.32 10*3/MM3 (ref 3.4–10.8)

## 2024-07-16 PROCEDURE — 99395 PREV VISIT EST AGE 18-39: CPT | Performed by: NURSE PRACTITIONER

## 2024-07-16 PROCEDURE — 1160F RVW MEDS BY RX/DR IN RCRD: CPT | Performed by: NURSE PRACTITIONER

## 2024-07-16 PROCEDURE — 1126F AMNT PAIN NOTED NONE PRSNT: CPT | Performed by: NURSE PRACTITIONER

## 2024-07-16 PROCEDURE — 2014F MENTAL STATUS ASSESS: CPT | Performed by: NURSE PRACTITIONER

## 2024-07-16 PROCEDURE — 1159F MED LIST DOCD IN RCRD: CPT | Performed by: NURSE PRACTITIONER

## 2024-07-16 PROCEDURE — 99213 OFFICE O/P EST LOW 20 MIN: CPT | Performed by: NURSE PRACTITIONER

## 2024-07-16 NOTE — PROGRESS NOTES
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 female who is here today for annual health maintenance and physical.  Still has large symptomatic varicose vein in LLE. Brother 1 year younger her developed the same thing.  She had an appointment with vascular but had to cancel due to school obligations.  She is requesting another referral and she plans to schedule an appointment in December.  There is no erythema, warmth.  Venous ultrasound with dilated varicose vein.  She is requesting lab draw for tuberculosis screening for new job and hospital  Vision is up-to-date.  Dental she needs to schedule.  She does not have a dermatologist requires referral.  Pap smear is up-to-date.  She does perform self breast exams.  She eats a balanced diet and is physically active.    US venous doppler lower extremity left (duplex) (06/15/2023 09:26)     Subjective      Review of Systems:   Pertinent ROS in HPI    Past Medical History, Social History, Family History and Care Team were all reviewed with patient and updated as appropriate.     Medications:     Current Outpatient Medications:     levonorgestrel (Kyleena) 19.5 MG intrauterine device IUD, Take to provider's office, Disp: 1 each, Rfl: 0    Allergies:   Allergies   Allergen Reactions    Amoxicillin Anaphylaxis       Immunizations:  Immunization History   Administered Date(s) Administered    COVID-19 (MODERNA) 1st,2nd,3rd Dose Monovalent 2021, 2021    COVID-19 (MODERNA) Monovalent Original Booster 2021    Fluzone (or Fluarix & Flulaval for VFC) >6mos 10/27/2022    HPV, Unspecified 2010, 10/27/2022    Hep B, Unspecified 2003, 2004, 2010     "Hepatitis A 01/14/2019, 07/19/2019    MMR 12/30/2003, 07/19/2004    Meningococcal ACYW (MENQUADFI) 04/20/2011, 11/20/2011    Meningococcal, Unspecified 09/01/2010    Td (TDVAX) 10/27/2022    Tdap 09/01/2010, 11/04/2022     Pap:   Last Completed Pap Smear            PAP SMEAR (Every 3 Years) Next due on 3/7/2026      03/07/2023  LIQUID-BASED PAP SMEAR WITH HPV GENOTYPING IF ASCUS ([facility])    10/06/2020  Liquid-based Pap Smear, Screening    06/19/2019  Liquid-based Pap Smear, Screening                     Tobacco Use: Low Risk  (7/16/2024)    Patient History     Smoking Tobacco Use: Never     Smokeless Tobacco Use: Never     Passive Exposure: Not on file       Social History     Substance and Sexual Activity   Alcohol Use Yes    Alcohol/week: 1.0 standard drink of alcohol    Types: 1 Glasses of wine per week        Social History     Substance and Sexual Activity   Drug Use No      Sexual Health: using contraception (Kyleena) not attempting pregnancy   Menopause: pre menopausal  Last menstrual cycle: 7/15/24, usually only has 1 day of bleeding which is inconsistent with the Kyleena    PHQ-2 Depression Screening  Little interest or pleasure in doing things? 0-->not at all   Feeling down, depressed, or hopeless? 0-->not at all   PHQ-2 Total Score 0     PHQ-9 Total Score: 0     Objective     Physical Exam:  Vital Signs:   Vitals:    07/16/24 0810   BP: 110/72   Pulse: 65   Temp: 97.7 °F (36.5 °C)   TempSrc: Infrared   SpO2: 100%   Weight: 83.9 kg (185 lb)   Height: 167.6 cm (65.98\")   PainSc: 0-No pain       Physical Exam  Vitals and nursing note reviewed.   Constitutional:       Appearance: Normal appearance.   HENT:      Head: Normocephalic and atraumatic.      Right Ear: Tympanic membrane, ear canal and external ear normal.      Left Ear: Tympanic membrane, ear canal and external ear normal.      Nose: Nose normal.      Mouth/Throat:      Mouth: Mucous membranes are moist.      Pharynx: Oropharynx is clear. "   Eyes:      General: No scleral icterus.     Extraocular Movements: Extraocular movements intact.      Conjunctiva/sclera: Conjunctivae normal.      Pupils: Pupils are equal, round, and reactive to light.   Neck:      Thyroid: No thyromegaly.   Cardiovascular:      Rate and Rhythm: Normal rate and regular rhythm.      Pulses:           Dorsalis pedis pulses are 2+ on the right side and 2+ on the left side.        Posterior tibial pulses are 2+ on the right side and 2+ on the left side.      Heart sounds: Normal heart sounds.      Comments: Dilated varicose vein left lower extremity  Pulmonary:      Effort: Pulmonary effort is normal.      Breath sounds: Normal breath sounds.   Abdominal:      General: Abdomen is flat. Bowel sounds are normal. There is no distension.      Palpations: Abdomen is soft. There is no mass.      Tenderness: There is no abdominal tenderness. There is no guarding or rebound.      Hernia: No hernia is present.   Musculoskeletal:         General: Normal range of motion.      Cervical back: Normal range of motion and neck supple.   Lymphadenopathy:      Cervical: No cervical adenopathy.   Skin:     General: Skin is warm and dry.      Findings: Acne present. No rash.   Neurological:      General: No focal deficit present.      Mental Status: She is alert and oriented to person, place, and time.      Cranial Nerves: No cranial nerve deficit.      Sensory: No sensory deficit.      Motor: No weakness.      Coordination: Coordination normal.      Gait: Gait normal.   Psychiatric:         Mood and Affect: Mood normal.         Behavior: Behavior normal.         Thought Content: Thought content normal.         Judgment: Judgment normal.         Assessment / Plan      Assessment/Plan:   Problem List Items Addressed This Visit    None  Visit Diagnoses       Annual physical exam    -  Primary    Relevant Orders    CBC (No Diff)    Comprehensive Metabolic Panel    Lipid Panel    TSH Rfx On Abnormal To  Free T4    Tuberculosis screening        Relevant Orders    QuantiFERON TB Gold    Symptomatic varicose veins of left lower extremity        Relevant Orders    Ambulatory Referral to Vascular Surgery  Patient continues to have a dilated varicose vein which causes pain especially when on her feet for long periods.  She is taking an aspirin on days where she is doing a lot of standing.  Referral to vascular surgery for further evaluation due to continuing to have a symptomatic varicose vein.  Discussed when to be seen emergently for evaluation.    Acne vulgaris        Relevant Orders    Ambulatory Referral to Dermatology    Screening for deficiency anemia        Relevant Orders    CBC (No Diff)    Screening for cholesterol level        Relevant Orders    Lipid Panel    Screening for endocrine, metabolic and immunity disorder        Relevant Orders    Comprehensive Metabolic Panel    TSH Rfx On Abnormal To Free T4            Healthcare Maintenance:  Counseling provided based on age appropriate USPSTF guidelines.  Encouraged 150 minutes of exercise total per week for heart health   Recommend balanced diet, portion control, limiting excess carbs and sweets, limit caffeine   Dental visits twice per year, yearly eye exams, yearly skin assessments with dermatology   Pap smear every 3 to 5 years with gynecology, self breast exam once a month  MV with folic acid daily  BMI is >= 25 and <30. (Overweight) The following options were offered after discussion;: information on healthy weight added to patient's after visit summary       Keyana Hunter voices understanding and acceptance of this advice and will call back with any further questions or concerns. AVS with preventive healthcare tips printed for patient.     Follow Up:   Return in about 1 year (around 7/16/2025) for Annual.      PATRICIA Rojas  [facility] [place]

## 2024-07-18 LAB
GAMMA INTERFERON BACKGROUND BLD IA-ACNC: 0.01 IU/ML
M TB IFN-G BLD-IMP: NEGATIVE
M TB IFN-G CD4+ BCKGRND COR BLD-ACNC: 0.03 IU/ML
M TB IFN-G CD4+CD8+ BCKGRND COR BLD-ACNC: 0.05 IU/ML
MITOGEN IGNF BCKGRD COR BLD-ACNC: >10 IU/ML
QUANTIFERON INCUBATION: NORMAL
SERVICE CMNT-IMP: NORMAL

## 2025-06-06 ENCOUNTER — LAB (OUTPATIENT)
Dept: LAB | Facility: HOSPITAL | Age: 30
End: 2025-06-06
Payer: MEDICAID

## 2025-06-06 ENCOUNTER — OFFICE VISIT (OUTPATIENT)
Dept: INTERNAL MEDICINE | Facility: CLINIC | Age: 30
End: 2025-06-06

## 2025-06-06 VITALS
TEMPERATURE: 97.7 F | OXYGEN SATURATION: 98 % | BODY MASS INDEX: 30.22 KG/M2 | DIASTOLIC BLOOD PRESSURE: 80 MMHG | WEIGHT: 188 LBS | SYSTOLIC BLOOD PRESSURE: 120 MMHG | HEIGHT: 66 IN | HEART RATE: 93 BPM

## 2025-06-06 DIAGNOSIS — M43.6 NECK STIFFNESS: ICD-10-CM

## 2025-06-06 DIAGNOSIS — S46.812A STRAIN OF LEFT TRAPEZIUS MUSCLE, INITIAL ENCOUNTER: ICD-10-CM

## 2025-06-06 DIAGNOSIS — R50.9 FEVER, UNSPECIFIED FEVER CAUSE: Primary | ICD-10-CM

## 2025-06-06 LAB
BASOPHILS # BLD AUTO: 0.06 10*3/MM3 (ref 0–0.2)
BASOPHILS NFR BLD AUTO: 1.4 % (ref 0–1.5)
CRP SERPL-MCNC: 0.73 MG/DL (ref 0–0.5)
DEPRECATED RDW RBC AUTO: 43.8 FL (ref 37–54)
EOSINOPHIL # BLD AUTO: 0.49 10*3/MM3 (ref 0–0.4)
EOSINOPHIL NFR BLD AUTO: 11.3 % (ref 0.3–6.2)
ERYTHROCYTE [DISTWIDTH] IN BLOOD BY AUTOMATED COUNT: 12.7 % (ref 12.3–15.4)
EXPIRATION DATE: NORMAL
FLUAV AG UPPER RESP QL IA.RAPID: NOT DETECTED
FLUBV AG UPPER RESP QL IA.RAPID: NOT DETECTED
HCT VFR BLD AUTO: 39.1 % (ref 34–46.6)
HGB BLD-MCNC: 13.5 G/DL (ref 12–15.9)
IMM GRANULOCYTES # BLD AUTO: 0.04 10*3/MM3 (ref 0–0.05)
IMM GRANULOCYTES NFR BLD AUTO: 0.9 % (ref 0–0.5)
INTERNAL CONTROL: NORMAL
LYMPHOCYTES # BLD AUTO: 1.79 10*3/MM3 (ref 0.7–3.1)
LYMPHOCYTES NFR BLD AUTO: 41.3 % (ref 19.6–45.3)
Lab: NORMAL
MACROCYTES BLD QL SMEAR: NORMAL
MCH RBC QN AUTO: 32.4 PG (ref 26.6–33)
MCHC RBC AUTO-ENTMCNC: 34.5 G/DL (ref 31.5–35.7)
MCV RBC AUTO: 93.8 FL (ref 79–97)
MONOCYTES # BLD AUTO: 0.37 10*3/MM3 (ref 0.1–0.9)
MONOCYTES NFR BLD AUTO: 8.5 % (ref 5–12)
NEUTROPHILS NFR BLD AUTO: 1.58 10*3/MM3 (ref 1.7–7)
NEUTROPHILS NFR BLD AUTO: 36.6 % (ref 42.7–76)
NRBC BLD AUTO-RTO: 0 /100 WBC (ref 0–0.2)
PLAT MORPH BLD: NORMAL
PLATELET # BLD AUTO: 209 10*3/MM3 (ref 140–450)
PMV BLD AUTO: 8.9 FL (ref 6–12)
PROCALCITONIN SERPL-MCNC: 0.34 NG/ML (ref 0–0.25)
RBC # BLD AUTO: 4.17 10*6/MM3 (ref 3.77–5.28)
SARS-COV-2 AG UPPER RESP QL IA.RAPID: NOT DETECTED
WBC MORPH BLD: NORMAL
WBC NRBC COR # BLD AUTO: 4.33 10*3/MM3 (ref 3.4–10.8)

## 2025-06-06 PROCEDURE — 85025 COMPLETE CBC W/AUTO DIFF WBC: CPT | Performed by: NURSE PRACTITIONER

## 2025-06-06 PROCEDURE — 84145 PROCALCITONIN (PCT): CPT | Performed by: NURSE PRACTITIONER

## 2025-06-06 PROCEDURE — 86140 C-REACTIVE PROTEIN: CPT | Performed by: NURSE PRACTITIONER

## 2025-06-06 PROCEDURE — 85007 BL SMEAR W/DIFF WBC COUNT: CPT | Performed by: NURSE PRACTITIONER

## 2025-06-06 RX ORDER — METHOCARBAMOL 500 MG/1
500 TABLET, FILM COATED ORAL 4 TIMES DAILY PRN
Qty: 90 TABLET | Refills: 0 | Status: SHIPPED | OUTPATIENT
Start: 2025-06-06

## 2025-06-06 NOTE — PROGRESS NOTES
"Chief Complaint   Patient presents with    Fever     Highest temp 101, has been taking medication to keep fever down     Shoulder Pain     Left sided X couple weeks.     Nausea     Subjective   Keyana Harrison is a 29 y.o. female presents for left sided neck pain and fever.     History of Present Illness  She has been experiencing intermittent fevers in the evenings, with a recorded peak temperature of 101 degrees F. The onset of her symptoms was on Monday, started with neck pain on the left side upon awakening.  When her mother, a massage therapist, noticed that she felt febrile during a neck massage. She reports no recent illness, tick bites or URI symptoms.  No known sick contacts.  She has been taking Tylenol and ibuprofen.  Her last dose of Tylenol was administered at approximately 1 AM last night and she remains afebrile currently. The pain in the neck is okay during the day but worsens as the day progresses.  Massage did help, said mother felt 3 knots which she worked out.  She describes the pain as a spasm, accompanied by tightness. She also reports tenderness in her jaw and at the base of her skull on left side. She rates her pain as 5 out of 10 today. She has been performing neck exercises and yoga in the morning, which she finds beneficial. She reports no confusion, vision changes, dizziness, headache. She has had some loss of appetite and nausea.       The following portions of the patient's history were reviewed and updated as appropriate: allergies, current medications, past family history, past medical history, past social history, past surgical history and problem list.    Review of Systems- fever, neck pain, nausea, others negative     Objective   /80   Pulse 93   Temp 97.7 °F (36.5 °C)   Ht 167.6 cm (65.98\")   Wt 85.3 kg (188 lb)   SpO2 98%   BMI 30.36 kg/m²   Body mass index is 30.36 kg/m².       Physical Exam  Vitals and nursing note reviewed.   Constitutional:       General: She is " not in acute distress.     Appearance: Normal appearance. She is not ill-appearing, toxic-appearing or diaphoretic.      Comments: Appears in pain    HENT:      Head: Normocephalic and atraumatic.      Right Ear: External ear normal.      Left Ear: External ear normal.      Nose: Nose normal.      Mouth/Throat:      Mouth: Mucous membranes are moist.      Pharynx: Oropharynx is clear.   Eyes:      General:         Right eye: No discharge.         Left eye: No discharge.      Extraocular Movements: Extraocular movements intact.      Conjunctiva/sclera: Conjunctivae normal.      Pupils: Pupils are equal, round, and reactive to light.   Neck:      Trachea: Trachea and phonation normal.      Comments: Neg kernig sign  Cardiovascular:      Rate and Rhythm: Normal rate and regular rhythm.      Heart sounds: Normal heart sounds.   Pulmonary:      Effort: Pulmonary effort is normal. No respiratory distress.      Breath sounds: Normal breath sounds. No wheezing or rhonchi.   Abdominal:      Tenderness: There is no abdominal tenderness.   Musculoskeletal:      Cervical back: Neck supple. No edema, erythema, signs of trauma, rigidity, torticollis or crepitus. Pain with movement and muscular tenderness (left trap) present. No spinous process tenderness. Decreased range of motion.      Right lower leg: No edema.      Left lower leg: No edema.   Lymphadenopathy:      Cervical: No cervical adenopathy.      Right cervical: No superficial, deep or posterior cervical adenopathy.     Left cervical: No superficial, deep or posterior cervical adenopathy.   Skin:     General: Skin is warm and dry.      Capillary Refill: Capillary refill takes less than 2 seconds.   Neurological:      General: No focal deficit present.      Mental Status: She is alert and oriented to person, place, and time. Mental status is at baseline.      Cranial Nerves: No cranial nerve deficit.      Sensory: No sensory deficit.      Motor: No weakness.       Coordination: Coordination normal.      Gait: Gait normal.   Psychiatric:         Mood and Affect: Mood normal.         Behavior: Behavior normal.         Thought Content: Thought content normal.         Judgment: Judgment normal.         Labs:  Results for orders placed or performed in visit on 06/06/25   POCT SARS-CoV-2 Antigen LISA + Flu    Collection Time: 06/06/25  9:12 AM    Specimen: Swab   Result Value Ref Range    SARS Antigen Not Detected Not Detected, Presumptive Negative    Influenza A Antigen LISA Not Detected Not Detected    Influenza B Antigen LISA Not Detected Not Detected    Internal Control Passed Passed    Lot Number [id number]     Expiration Date 2/7/26    C-reactive protein    Collection Time: 06/06/25  9:24 AM    Specimen: Blood   Result Value Ref Range    C-Reactive Protein 0.73 (H) 0.00 - 0.50 mg/dL   Procalcitonin    Collection Time: 06/06/25  9:24 AM    Specimen: Blood   Result Value Ref Range    Procalcitonin 0.34 (H) 0.00 - 0.25 ng/mL   CBC Auto Differential    Collection Time: 06/06/25  9:24 AM    Specimen: Blood   Result Value Ref Range    WBC 4.33 3.40 - 10.80 10*3/mm3    RBC 4.17 3.77 - 5.28 10*6/mm3    Hemoglobin 13.5 12.0 - 15.9 g/dL    Hematocrit 39.1 34.0 - 46.6 %    MCV 93.8 79.0 - 97.0 fL    MCH 32.4 26.6 - 33.0 pg    MCHC 34.5 31.5 - 35.7 g/dL    RDW 12.7 12.3 - 15.4 %    RDW-SD 43.8 37.0 - 54.0 fl    MPV 8.9 6.0 - 12.0 fL    Platelets 209 140 - 450 10*3/mm3    Neutrophil % 36.6 (L) 42.7 - 76.0 %    Lymphocyte % 41.3 19.6 - 45.3 %    Monocyte % 8.5 5.0 - 12.0 %    Eosinophil % 11.3 (H) 0.3 - 6.2 %    Basophil % 1.4 0.0 - 1.5 %    Immature Grans % 0.9 (H) 0.0 - 0.5 %    Neutrophils, Absolute 1.58 (L) 1.70 - 7.00 10*3/mm3    Lymphocytes, Absolute 1.79 0.70 - 3.10 10*3/mm3    Monocytes, Absolute 0.37 0.10 - 0.90 10*3/mm3    Eosinophils, Absolute 0.49 (H) 0.00 - 0.40 10*3/mm3    Basophils, Absolute 0.06 0.00 - 0.20 10*3/mm3    Immature Grans, Absolute 0.04 0.00 - 0.05 10*3/mm3    nRBC  0.0 0.0 - 0.2 /100 WBC   Scan Slide    Collection Time: 06/06/25  9:24 AM    Specimen: Blood   Result Value Ref Range    Macrocytes Slight/1+ None Seen    WBC Morphology Normal Normal    Platelet Morphology Normal Normal       Assessment & Plan   Keyana Harrison is here today and the following problems have been addressed:      Diagnoses and all orders for this visit:    1. Fever, unspecified fever cause (Primary)  -     C-reactive protein  -     CBC & Differential  -     Procalcitonin  -     POCT SARS-CoV-2 Antigen LISA + Flu  -     Mahsa-Barr Virus VCA, IgM  -     Cytomegalovirus Antibody, IgM    2. Neck stiffness  -     C-reactive protein  -     CBC & Differential  -     Procalcitonin  -     POCT SARS-CoV-2 Antigen LISA + Flu    3. Strain of left trapezius muscle, initial encounter  -     methocarbamol (ROBAXIN) 500 MG tablet; Take 1 tablet by mouth 4 (Four) Times a Day As Needed for Muscle Spasms.  Dispense: 90 tablet; Refill: 0    Assessment & Plan  COVID/flu negative and she has no upper respiratory symptoms.  There is no lymphadenopathy palpated on neck.  I do believe there is a muscle spasm in the left trap, Robaxin prescribed to take up to 4 times a day as needed, recommend exercise/stretches, heat and ice, consider imaging of cervical spine if persists  Due to having an unspecified fever and neck stiffness discussed meningitis and red flag signs in detail but patient has not taken Tylenol since 1 AM last night she does not currently have a fever, symptoms have been going on for a week and would have suspected them to significantly worsen if this was the case. Hien neg. To be cautious ordered CBC with differential, procalcitonin, CRP. CBC with slightly low neutrophils and grans, elevated eosinophils, normal total white count, procalcitonin <0.5 which is low risk for sepsis, crp slightly elevated. Do suspect viral illness. Add on EBV and CMV IgM to labs and plan to follow up 1-2 weeks. Advised to go to ED  if symptoms significantly worsen.     Follow Up   1-2 weeks  Patient was given instructions and counseling regarding her condition or for health maintenance advice. Please see specific information pulled into the AVS if appropriate.     Adali GOMEZ  [facility] spent 35 minutes caring for Keyana Harrison on this date of service. This time includes time spent by me in the following activities: preparing for the visit, reviewing tests, performing a medically appropriate examination and/or evaluation, counseling and educating the patient/family/caregiver, ordering medications, tests, or procedures and documenting information in the medical record.        Patient or patient representative verbalized consent for the use of Ambient Listening during the visit with  PATRICIA Niño for chart documentation. 6/6/2025  10:18 EDT

## 2025-06-16 ENCOUNTER — OFFICE VISIT (OUTPATIENT)
Dept: INTERNAL MEDICINE | Facility: CLINIC | Age: 30
End: 2025-06-16

## 2025-06-16 VITALS
WEIGHT: 188 LBS | TEMPERATURE: 97.5 F | OXYGEN SATURATION: 98 % | SYSTOLIC BLOOD PRESSURE: 124 MMHG | BODY MASS INDEX: 30.22 KG/M2 | HEIGHT: 66 IN | DIASTOLIC BLOOD PRESSURE: 72 MMHG | HEART RATE: 72 BPM

## 2025-06-16 DIAGNOSIS — R53.83 FATIGUE, UNSPECIFIED TYPE: ICD-10-CM

## 2025-06-16 DIAGNOSIS — S16.1XXD STRAIN OF NECK MUSCLE, SUBSEQUENT ENCOUNTER: Primary | ICD-10-CM

## 2025-06-16 DIAGNOSIS — R20.0 NUMBNESS OF TOES: ICD-10-CM

## 2025-06-16 PROCEDURE — 99214 OFFICE O/P EST MOD 30 MIN: CPT | Performed by: NURSE PRACTITIONER

## 2025-06-17 NOTE — PROGRESS NOTES
Office Visit      Date: 2025   Patient Name: Keyana Harrison  : 1995   MRN: [id number]     Chief Complaint:    Chief Complaint   Patient presents with    Neck Pain       History of Present Illness: Keyana Harrison is a 29 y.o. female presents to follow up.  Presented with left sided neck strain, fever, fatigue. Labs with a mildly elevated procal and CRP. Neutrophils mildly low. Ordered EBV/CMV labs but were unable to be added.   Symptoms have improved. Takes robaxin, helps for about 6-8 hours then starts to get painful again. Takes ibuprofen sparingly as needed. Interested in physical therapy, [facility].   Fevers stopped on Thursday. Still feeling more tired than usual.  Of note her left great toe is numb. Has been for about a week, she wore heels to a wedding and danced all night and she contributes it to this     Subjective      Review of Systems:   Pertinent ROS noted in HPI.     I have reviewed the patients family history, social history, past medical history, past surgical history and have updated it as appropriate.     Medications:     Current Outpatient Medications:     methocarbamol (ROBAXIN) 500 MG tablet, Take 1 tablet by mouth 4 (Four) Times a Day As Needed for Muscle Spasms., Disp: 90 tablet, Rfl: 0    tretinoin (RETIN-A) 0.05 % cream, Apply a pea sized amount to the face once nightly at bedtime. Follow with moisturizer., Disp: 45 g, Rfl: 1    Allergies:   Allergies   Allergen Reactions    Amoxicillin Anaphylaxis       Objective     Physical Exam  Vitals and nursing note reviewed.   Constitutional:       General: She is not in acute distress.     Appearance: Normal appearance. She is not ill-appearing or toxic-appearing.   HENT:      Head: Normocephalic and atraumatic.      Nose: Nose normal.      Mouth/Throat:      Mouth: Mucous membranes are moist.   Eyes:      Extraocular Movements: Extraocular movements intact.      Pupils: Pupils are equal, round, and reactive to light.  "  Neck:      Trachea: Trachea and phonation normal.   Cardiovascular:      Rate and Rhythm: Normal rate and regular rhythm.   Pulmonary:      Effort: Pulmonary effort is normal.   Musculoskeletal:      Cervical back: Neck supple. Tenderness present. No edema, erythema, signs of trauma, rigidity, torticollis or crepitus. Pain with movement and muscular tenderness (left trap) present. No spinous process tenderness. Decreased range of motion.   Lymphadenopathy:      Cervical: No cervical adenopathy.   Skin:     General: Skin is warm and dry.   Neurological:      General: No focal deficit present.      Mental Status: She is alert and oriented to person, place, and time. Mental status is at baseline.   Psychiatric:         Mood and Affect: Mood normal.         Behavior: Behavior normal.         Vital Signs:   Vitals:    06/16/25 1722   BP: 124/72   Pulse: 72   Temp: 97.5 °F (36.4 °C)   SpO2: 98%   Weight: 85.3 kg (188 lb)   Height: 167.6 cm (65.98\")     Body mass index is 30.36 kg/m².      Labs:   Office Visit on 06/06/2025   Component Date Value Ref Range Status    C-Reactive Protein 06/06/2025 0.73 (H)  0.00 - 0.50 mg/dL Final    Procalcitonin 06/06/2025 0.34 (H)  0.00 - 0.25 ng/mL Final    SARS Antigen 06/06/2025 Not Detected  Not Detected, Presumptive Negative Final    Influenza A Antigen LISA 06/06/2025 Not Detected  Not Detected Final    Influenza B Antigen LISA 06/06/2025 Not Detected  Not Detected Final    Internal Control 06/06/2025 Passed  Passed Final    Lot Number 06/06/2025 [id number]   Final    Expiration Date 06/06/2025 2/7/26   Final    WBC 06/06/2025 4.33  3.40 - 10.80 10*3/mm3 Final    RBC 06/06/2025 4.17  3.77 - 5.28 10*6/mm3 Final    Hemoglobin 06/06/2025 13.5  12.0 - 15.9 g/dL Final    Hematocrit 06/06/2025 39.1  34.0 - 46.6 % Final    MCV 06/06/2025 93.8  79.0 - 97.0 fL Final    MCH 06/06/2025 32.4  26.6 - 33.0 pg Final    MCHC 06/06/2025 34.5  31.5 - 35.7 g/dL Final    RDW 06/06/2025 12.7  12.3 - 15.4 " % Final    RDW-SD 06/06/2025 43.8  37.0 - 54.0 fl Final    MPV 06/06/2025 8.9  6.0 - 12.0 fL Final    Platelets 06/06/2025 209  140 - 450 10*3/mm3 Final    Neutrophil % 06/06/2025 36.6 (L)  42.7 - 76.0 % Final    Lymphocyte % 06/06/2025 41.3  19.6 - 45.3 % Final    Monocyte % 06/06/2025 8.5  5.0 - 12.0 % Final    Eosinophil % 06/06/2025 11.3 (H)  0.3 - 6.2 % Final    Basophil % 06/06/2025 1.4  0.0 - 1.5 % Final    Immature Grans % 06/06/2025 0.9 (H)  0.0 - 0.5 % Final    Neutrophils, Absolute 06/06/2025 1.58 (L)  1.70 - 7.00 10*3/mm3 Final    Lymphocytes, Absolute 06/06/2025 1.79  0.70 - 3.10 10*3/mm3 Final    Monocytes, Absolute 06/06/2025 0.37  0.10 - 0.90 10*3/mm3 Final    Eosinophils, Absolute 06/06/2025 0.49 (H)  0.00 - 0.40 10*3/mm3 Final    Basophils, Absolute 06/06/2025 0.06  0.00 - 0.20 10*3/mm3 Final    Immature Grans, Absolute 06/06/2025 0.04  0.00 - 0.05 10*3/mm3 Final    nRBC 06/06/2025 0.0  0.0 - 0.2 /100 WBC Final    Macrocytes 06/06/2025 Slight/1+  None Seen Final    WBC Morphology 06/06/2025 Normal  Normal Final    Platelet Morphology 06/06/2025 Normal  Normal Final        Assessment / Plan      Assessment/Plan:   Problem List Items Addressed This Visit    None  Visit Diagnoses         Strain of neck muscle, subsequent encounter    -  Primary    Relevant Orders    Ambulatory Referral to Physical Therapy for Evaluation & Treatment (Completed)      Fatigue, unspecified type        Relevant Orders    Vitamin B12    Folate    Vitamin D 25 hydroxy    Iron and TIBC    Ferritin    OMER With / DsDNA, RNP, Sjogrens A / B, Smith    Rheumatoid Factor      Numbness of toe        Relevant Orders    OMER With / DsDNA, RNP, Sjogrens A / B, Smith    Rheumatoid Factor            Referral PT. Continue exercises/stretches, muscle relaxers, NSAIDs prn, heat/ice, massage. Consider imaging if not better after 6 weeks of PT.   Lingering fatigue. Fevers have resolved. I do suspect patient had a viral illness. Discussed if  fatigue lingers to obtain labs; EBV, CMV, B12, folate, Vitamin D, B12, Iron, TIBC, Ferritin, OMER  Numbness of toe I agree is likely from dancing in tight heels. If does not resolve can consider further workup with labs and nerve conduction studies     Follow Up:   6 weeks after PT, sooner mike GOMEZ  [facility] [place]

## 2025-06-19 LAB
25(OH)D3+25(OH)D2 SERPL-MCNC: 40.8 NG/ML (ref 30–100)
ANA SER QL: NEGATIVE
FERRITIN SERPL-MCNC: 363 NG/ML (ref 15–150)
FOLATE SERPL-MCNC: 7.9 NG/ML
IRON SATN MFR SERPL: 14 % (ref 15–55)
IRON SERPL-MCNC: 45 UG/DL (ref 27–159)
RHEUMATOID FACT SERPL-ACNC: <10 IU/ML
TIBC SERPL-MCNC: 311 UG/DL (ref 250–450)
UIBC SERPL-MCNC: 266 UG/DL (ref 131–425)
VIT B12 SERPL-MCNC: 1082 PG/ML (ref 232–1245)